# Patient Record
Sex: FEMALE | Race: WHITE | NOT HISPANIC OR LATINO | Employment: OTHER | URBAN - METROPOLITAN AREA
[De-identification: names, ages, dates, MRNs, and addresses within clinical notes are randomized per-mention and may not be internally consistent; named-entity substitution may affect disease eponyms.]

---

## 2020-07-30 ENCOUNTER — HOSPITAL ENCOUNTER (OUTPATIENT)
Facility: HOSPITAL | Age: 70
Discharge: HOME | End: 2020-07-30
Attending: EMERGENCY MEDICINE | Admitting: INTERNAL MEDICINE
Payer: MEDICARE

## 2020-07-30 VITALS
HEIGHT: 68 IN | TEMPERATURE: 98 F | HEART RATE: 77 BPM | WEIGHT: 156.94 LBS | RESPIRATION RATE: 19 BRPM | SYSTOLIC BLOOD PRESSURE: 117 MMHG | BODY MASS INDEX: 23.79 KG/M2 | DIASTOLIC BLOOD PRESSURE: 65 MMHG | OXYGEN SATURATION: 96 %

## 2020-07-30 DIAGNOSIS — I15.9 SECONDARY HYPERTENSION: ICD-10-CM

## 2020-07-30 DIAGNOSIS — I25.10 CORONARY ARTERY DISEASE WITHOUT ANGINA PECTORIS, UNSPECIFIED VESSEL OR LESION TYPE, UNSPECIFIED WHETHER NATIVE OR TRANSPLANTED HEART: ICD-10-CM

## 2020-07-30 DIAGNOSIS — R07.9 CHEST PAIN: Primary | ICD-10-CM

## 2020-07-30 PROBLEM — I10 HYPERTENSION: Status: ACTIVE | Noted: 2020-07-30

## 2020-07-30 LAB
ALBUMIN SERPL BCP-MCNC: 3.6 G/DL (ref 3.5–5.2)
ALP SERPL-CCNC: 78 U/L (ref 55–135)
ALT SERPL W/O P-5'-P-CCNC: 19 U/L (ref 10–44)
ANION GAP SERPL CALC-SCNC: 8 MMOL/L (ref 8–16)
AST SERPL-CCNC: 23 U/L (ref 10–40)
BASOPHILS # BLD AUTO: 0.04 K/UL (ref 0–0.2)
BASOPHILS NFR BLD: 0.4 % (ref 0–1.9)
BILIRUB SERPL-MCNC: 0.6 MG/DL (ref 0.1–1)
BNP SERPL-MCNC: 92 PG/ML (ref 0–99)
BUN SERPL-MCNC: 12 MG/DL (ref 8–23)
CALCIUM SERPL-MCNC: 9 MG/DL (ref 8.7–10.5)
CHLORIDE SERPL-SCNC: 102 MMOL/L (ref 95–110)
CHOLEST SERPL-MCNC: 114 MG/DL (ref 120–199)
CHOLEST/HDLC SERPL: 2.5 {RATIO} (ref 2–5)
CO2 SERPL-SCNC: 27 MMOL/L (ref 23–29)
CREAT SERPL-MCNC: 0.8 MG/DL (ref 0.5–1.4)
D DIMER PPP IA.FEU-MCNC: 0.51 UG/ML FEU
DIFFERENTIAL METHOD: ABNORMAL
EOSINOPHIL # BLD AUTO: 0.1 K/UL (ref 0–0.5)
EOSINOPHIL NFR BLD: 1.2 % (ref 0–8)
ERYTHROCYTE [DISTWIDTH] IN BLOOD BY AUTOMATED COUNT: 12.5 % (ref 11.5–14.5)
EST. GFR  (AFRICAN AMERICAN): >60 ML/MIN/1.73 M^2
EST. GFR  (NON AFRICAN AMERICAN): >60 ML/MIN/1.73 M^2
GLUCOSE SERPL-MCNC: 113 MG/DL (ref 70–110)
HCT VFR BLD AUTO: 36.4 % (ref 37–48.5)
HDLC SERPL-MCNC: 46 MG/DL (ref 40–75)
HDLC SERPL: 40.4 % (ref 20–50)
HGB BLD-MCNC: 11.8 G/DL (ref 12–16)
IMM GRANULOCYTES # BLD AUTO: 0.02 K/UL (ref 0–0.04)
IMM GRANULOCYTES NFR BLD AUTO: 0.2 % (ref 0–0.5)
LDLC SERPL CALC-MCNC: 49.4 MG/DL (ref 63–159)
LYMPHOCYTES # BLD AUTO: 1.4 K/UL (ref 1–4.8)
LYMPHOCYTES NFR BLD: 15.2 % (ref 18–48)
MCH RBC QN AUTO: 29.6 PG (ref 27–31)
MCHC RBC AUTO-ENTMCNC: 32.4 G/DL (ref 32–36)
MCV RBC AUTO: 92 FL (ref 82–98)
MONOCYTES # BLD AUTO: 0.6 K/UL (ref 0.3–1)
MONOCYTES NFR BLD: 5.9 % (ref 4–15)
NEUTROPHILS # BLD AUTO: 7.2 K/UL (ref 1.8–7.7)
NEUTROPHILS NFR BLD: 77.1 % (ref 38–73)
NONHDLC SERPL-MCNC: 68 MG/DL
NRBC BLD-RTO: 0 /100 WBC
PLATELET # BLD AUTO: 181 K/UL (ref 150–350)
PMV BLD AUTO: 10.4 FL (ref 9.2–12.9)
POTASSIUM SERPL-SCNC: 4.1 MMOL/L (ref 3.5–5.1)
PROT SERPL-MCNC: 7 G/DL (ref 6–8.4)
RBC # BLD AUTO: 3.98 M/UL (ref 4–5.4)
SARS-COV-2 RDRP RESP QL NAA+PROBE: NEGATIVE
SODIUM SERPL-SCNC: 137 MMOL/L (ref 136–145)
TRIGL SERPL-MCNC: 93 MG/DL (ref 30–150)
TROPONIN I SERPL DL<=0.01 NG/ML-MCNC: 0.03 NG/ML
TROPONIN I SERPL DL<=0.01 NG/ML-MCNC: <0.03 NG/ML
WBC # BLD AUTO: 9.35 K/UL (ref 3.9–12.7)

## 2020-07-30 PROCEDURE — 63600175 PHARM REV CODE 636 W HCPCS: Performed by: INTERNAL MEDICINE

## 2020-07-30 PROCEDURE — 84484 ASSAY OF TROPONIN QUANT: CPT

## 2020-07-30 PROCEDURE — 85025 COMPLETE CBC W/AUTO DIFF WBC: CPT

## 2020-07-30 PROCEDURE — 83036 HEMOGLOBIN GLYCOSYLATED A1C: CPT

## 2020-07-30 PROCEDURE — 36415 COLL VENOUS BLD VENIPUNCTURE: CPT

## 2020-07-30 PROCEDURE — 84484 ASSAY OF TROPONIN QUANT: CPT | Mod: 91

## 2020-07-30 PROCEDURE — 83880 ASSAY OF NATRIURETIC PEPTIDE: CPT

## 2020-07-30 PROCEDURE — 93005 ELECTROCARDIOGRAM TRACING: CPT | Performed by: INTERNAL MEDICINE

## 2020-07-30 PROCEDURE — 25000003 PHARM REV CODE 250: Performed by: INTERNAL MEDICINE

## 2020-07-30 PROCEDURE — U0002 COVID-19 LAB TEST NON-CDC: HCPCS

## 2020-07-30 PROCEDURE — 99285 EMERGENCY DEPT VISIT HI MDM: CPT | Mod: 25

## 2020-07-30 PROCEDURE — G0378 HOSPITAL OBSERVATION PER HR: HCPCS

## 2020-07-30 PROCEDURE — 80053 COMPREHEN METABOLIC PANEL: CPT

## 2020-07-30 PROCEDURE — 85379 FIBRIN DEGRADATION QUANT: CPT

## 2020-07-30 PROCEDURE — 80061 LIPID PANEL: CPT

## 2020-07-30 PROCEDURE — 25000003 PHARM REV CODE 250: Performed by: EMERGENCY MEDICINE

## 2020-07-30 PROCEDURE — G0378 HOSPITAL OBSERVATION PER HR: HCPCS | Mod: CS

## 2020-07-30 RX ORDER — PREDNISONE 20 MG/1
20 TABLET ORAL DAILY
Status: DISCONTINUED | OUTPATIENT
Start: 2020-07-30 | End: 2020-07-30 | Stop reason: HOSPADM

## 2020-07-30 RX ORDER — FAMOTIDINE 40 MG/1
40 TABLET, FILM COATED ORAL DAILY
COMMUNITY
End: 2020-11-17 | Stop reason: SDUPTHER

## 2020-07-30 RX ORDER — NITROGLYCERIN 80 MG/1
1 PATCH TRANSDERMAL DAILY
Status: ON HOLD | COMMUNITY
End: 2020-07-30 | Stop reason: HOSPADM

## 2020-07-30 RX ORDER — ASPIRIN 325 MG
325 TABLET ORAL
Status: COMPLETED | OUTPATIENT
Start: 2020-07-30 | End: 2020-07-30

## 2020-07-30 RX ORDER — CLOPIDOGREL BISULFATE 75 MG/1
75 TABLET ORAL DAILY
Status: DISCONTINUED | OUTPATIENT
Start: 2020-07-31 | End: 2020-07-30 | Stop reason: HOSPADM

## 2020-07-30 RX ORDER — SODIUM CHLORIDE 0.9 % (FLUSH) 0.9 %
10 SYRINGE (ML) INJECTION
Status: DISCONTINUED | OUTPATIENT
Start: 2020-07-30 | End: 2020-07-30 | Stop reason: HOSPADM

## 2020-07-30 RX ORDER — CARVEDILOL 3.12 MG/1
3.12 TABLET ORAL 2 TIMES DAILY WITH MEALS
COMMUNITY
End: 2020-11-02 | Stop reason: SDUPTHER

## 2020-07-30 RX ORDER — ACETAMINOPHEN 160 MG/5ML
200 SUSPENSION, ORAL (FINAL DOSE FORM) ORAL DAILY
Status: DISCONTINUED | OUTPATIENT
Start: 2020-07-30 | End: 2020-07-30

## 2020-07-30 RX ORDER — GLUCAGON 1 MG
1 KIT INJECTION
Status: DISCONTINUED | OUTPATIENT
Start: 2020-07-30 | End: 2020-07-30 | Stop reason: HOSPADM

## 2020-07-30 RX ORDER — LOSARTAN POTASSIUM 25 MG/1
25 TABLET ORAL DAILY
COMMUNITY
End: 2021-08-04

## 2020-07-30 RX ORDER — PANTOPRAZOLE SODIUM 40 MG/1
40 TABLET, DELAYED RELEASE ORAL
Status: DISCONTINUED | OUTPATIENT
Start: 2020-07-30 | End: 2020-07-30 | Stop reason: HOSPADM

## 2020-07-30 RX ORDER — NAPROXEN SODIUM 220 MG/1
81 TABLET, FILM COATED ORAL DAILY
Status: DISCONTINUED | OUTPATIENT
Start: 2020-07-31 | End: 2020-07-30 | Stop reason: HOSPADM

## 2020-07-30 RX ORDER — IBUPROFEN 200 MG
16 TABLET ORAL
Status: DISCONTINUED | OUTPATIENT
Start: 2020-07-30 | End: 2020-07-30 | Stop reason: HOSPADM

## 2020-07-30 RX ORDER — NITROGLYCERIN 80 MG/1
1 PATCH TRANSDERMAL DAILY
Status: DISCONTINUED | OUTPATIENT
Start: 2020-07-30 | End: 2020-07-30

## 2020-07-30 RX ORDER — CLOPIDOGREL BISULFATE 75 MG/1
75 TABLET ORAL DAILY
COMMUNITY
End: 2020-11-02 | Stop reason: SDUPTHER

## 2020-07-30 RX ORDER — FAMOTIDINE 20 MG/1
40 TABLET, FILM COATED ORAL DAILY
Status: DISCONTINUED | OUTPATIENT
Start: 2020-07-30 | End: 2020-07-30

## 2020-07-30 RX ORDER — CARVEDILOL 3.12 MG/1
3.12 TABLET ORAL 2 TIMES DAILY WITH MEALS
Status: DISCONTINUED | OUTPATIENT
Start: 2020-07-30 | End: 2020-07-30 | Stop reason: HOSPADM

## 2020-07-30 RX ORDER — ISOSORBIDE MONONITRATE 30 MG/1
30 TABLET, EXTENDED RELEASE ORAL DAILY
COMMUNITY
End: 2021-03-01

## 2020-07-30 RX ORDER — LANOLIN ALCOHOL/MO/W.PET/CERES
400 CREAM (GRAM) TOPICAL DAILY
Status: DISCONTINUED | OUTPATIENT
Start: 2020-07-31 | End: 2020-07-30 | Stop reason: HOSPADM

## 2020-07-30 RX ORDER — LOSARTAN POTASSIUM 25 MG/1
25 TABLET ORAL DAILY
Status: DISCONTINUED | OUTPATIENT
Start: 2020-07-31 | End: 2020-07-30 | Stop reason: HOSPADM

## 2020-07-30 RX ORDER — IBUPROFEN 200 MG
24 TABLET ORAL
Status: DISCONTINUED | OUTPATIENT
Start: 2020-07-30 | End: 2020-07-30 | Stop reason: HOSPADM

## 2020-07-30 RX ORDER — NAPROXEN SODIUM 220 MG/1
81 TABLET, FILM COATED ORAL DAILY
COMMUNITY

## 2020-07-30 RX ORDER — ACETAMINOPHEN 160 MG/5ML
200 SUSPENSION, ORAL (FINAL DOSE FORM) ORAL DAILY
COMMUNITY

## 2020-07-30 RX ORDER — ATORVASTATIN CALCIUM 40 MG/1
40 TABLET, FILM COATED ORAL NIGHTLY
Status: DISCONTINUED | OUTPATIENT
Start: 2020-07-30 | End: 2020-07-30 | Stop reason: HOSPADM

## 2020-07-30 RX ORDER — LANOLIN ALCOHOL/MO/W.PET/CERES
400 CREAM (GRAM) TOPICAL DAILY
COMMUNITY
End: 2021-04-21

## 2020-07-30 RX ORDER — ATORVASTATIN CALCIUM 40 MG/1
40 TABLET, FILM COATED ORAL DAILY
COMMUNITY
End: 2020-11-02 | Stop reason: SDUPTHER

## 2020-07-30 RX ORDER — ISOSORBIDE MONONITRATE 30 MG/1
30 TABLET, EXTENDED RELEASE ORAL DAILY
Status: DISCONTINUED | OUTPATIENT
Start: 2020-07-30 | End: 2020-07-30 | Stop reason: HOSPADM

## 2020-07-30 RX ADMIN — PREDNISONE 20 MG: 20 TABLET ORAL at 04:07

## 2020-07-30 RX ADMIN — ISOSORBIDE MONONITRATE 30 MG: 30 TABLET, EXTENDED RELEASE ORAL at 05:07

## 2020-07-30 RX ADMIN — CARVEDILOL 3.12 MG: 3.12 TABLET, FILM COATED ORAL at 04:07

## 2020-07-30 RX ADMIN — ASPIRIN 325 MG ORAL TABLET 325 MG: 325 PILL ORAL at 09:07

## 2020-07-30 NOTE — CONSULTS
[facility]  Department of Cardiology  Consult Note      PATIENT NAME: Holly French  MRN: [id number]  TODAY'S DATE: 07/30/2020  ADMIT DATE: 7/30/2020                          CONSULT REQUESTED BY: Chuck Mattson MD    SUBJECTIVE     PRINCIPAL PROBLEM: Chest pain      REASON FOR CONSULT:  Chest Pain      HPI:  Ms. French is a 70 year old female known to me. She has a history of CAD S/P PCI to LAD 2.5 X 18mm in 2017. She tells me she has been in her normal state of health untl this morning. Well last night after she ate she had a chest pain but it went away and 2 weeks ago she had a pain but went away. This morning it did not go away. She had no associated symptoms. She denies recent fever or chills. No arm neck or jaw pain. Has some dyspepsia which is chronic. First set of cardiac enzymes are negative awaiting second set. She describes it as exertional.     FROM H AND P     70-year-old female  With past medical history of hypertension, CAD status post stent in 2017 came to ER with CP.  She had brief history of right-sided chest pain about a few days back and self resolved . But this morning after she wake approximately 5:30 a.m. she felt left-sided nonradiating chest pain and persistent and came to ER .  No associated shortness of breath, vomiting, diaphoresis, fevers, chills.  She also concerned about she might probably have pleurisy because she was treated in the past 2 times by the primary care physician and she admits that she have mild intermittent cough occasionally .  She still currently on both aspirin and Plavix.  She is due to see with Dr. LATRELL Medley  today.  No recent stress test or echocardiogram was done .  She is not keen to do the stress test in house  and she rather wants to do as outpatient.  Denies fever, production of sputum, pleuritic chest pain, and no history of prolonged immobilization or travel.       Review of patient's allergies indicates:  No Known Allergies    Past Medical  History:   Diagnosis Date    Coronary artery disease     cardiac stent    Hypertension      History reviewed. No pertinent surgical history.  Social History     Tobacco Use    Smoking status: Never Smoker   Substance Use Topics    Alcohol use: Not Currently    Drug use: Not Currently        REVIEW OF SYSTEMS  CONSTITUTIONAL: Negative for chills, fatigue and fever.   EYES: No double vision, No blurred vision  NEURO: No headaches, No dizziness  RESPIRATORY: Negative for cough, shortness of breath and wheezing.    CARDIOVASCULAR: + chest pain  GI: Negative for abdominal pain, No melena, diarrhea, nausea and vomiting.   : Negative for dysuria and frequency, Negative for hematuria  SKIN: Negative for bruising, Negative for edema or discoloration noted.   ENDOCRINE: Negative for polyphagia, Negative for heat intolerance, Negative for cold intolerance  PSYCHIATRIC: Negative for depression, Negative for anxiety, Negative for memory loss  MUSCULOSKELETAL: Negative for neck pain, Negative for muscle weakness, Negative for back pain     OBJECTIVE     VITAL SIGNS (Most Recent)  Temp: 98.1 °F (36.7 °C) (07/30/20 1414)  Pulse: 70 (07/30/20 1414)  Resp: 18 (07/30/20 1414)  BP: (!) 148/65 (07/30/20 1414)  SpO2: 97 % (07/30/20 1414)    VENTILATION STATUS  Resp: 18 (07/30/20 1414)  SpO2: 97 % (07/30/20 1414)       I & O (Last 24H):No intake or output data in the 24 hours ending 07/30/20 1447    WEIGHTS  Wt Readings from Last 1 Encounters:   07/30/20 1414 71.2 kg (156 lb 15.5 oz)   07/30/20 0916 70.3 kg (155 lb)       PHYSICAL EXAM  GENERAL: well built, well nourished, well-developed in no apparent distress alert and oriented.   HEENT: Normocephalic. Pupils normal and conjunctivae normal.  Mucous membranes normal, no cyanosis or icterus, trachea central,no pallor or icterus is noted..   NECK: No JVD. No bruit..   THYROID: Thyroid not enlarged. No nodules present..   CARDIAC: Regular rate and rhythm. S1 is normal.S2 is normal.No  gallops, clicks or murmurs noted at this time.  CHEST ANATOMY: normal.   LUNGS: Clear to auscultation. No wheezing or rhonchi..   ABDOMEN: Soft no masses or organomegaly.  No abdomen pulsations or bruits.  Normal bowel sounds. No pulsations and no masses felt, No guarding or rebound.   URINARY: No hurtado catheter   EXTREMITIES: No cyanosis, clubbing or edema noted at this time., no calf tenderness bilaterally.   PERIPHERAL VASCULAR SYSTEM: Good palpable distal pulses.   CENTRAL NERVOUS SYSTEM: No focal motor or sensory deficits noted.   SKIN: Skin without lesions, moist, well perfused.   MUSCLE STRENGTH & TONE: No noteable weakness, atrophy or abnormal movement.     HOME MEDICATIONS:  No current facility-administered medications on file prior to encounter.      Current Outpatient Medications on File Prior to Encounter   Medication Sig Dispense Refill    aspirin 81 MG Chew Take 81 mg by mouth once daily.      atorvastatin (LIPITOR) 40 MG tablet Take 40 mg by mouth once daily.      carvediloL (COREG) 3.125 MG tablet Take 3.125 mg by mouth 2 (two) times daily with meals.      clopidogreL (PLAVIX) 75 mg tablet Take 75 mg by mouth once daily.      coenzyme Q10 200 mg capsule Take 200 mg by mouth once daily.      famotidine (PEPCID) 40 MG tablet Take 40 mg by mouth once daily.      isosorbide mononitrate (IMDUR) 30 MG 24 hr tablet Take 30 mg by mouth once daily.      losartan (COZAAR) 25 MG tablet Take 25 mg by mouth once daily.      magnesium oxide (MAG-OX) 400 mg (241.3 mg magnesium) tablet Take 400 mg by mouth once daily.      nitroGLYCERIN 0.4 MG/HR TD PT24 (NITRODUR) 0.4 mg/hr Place 1 patch onto the skin once daily.         SCHEDULED MEDS:   [START ON 7/31/2020] aspirin  81 mg Oral Daily    atorvastatin  40 mg Oral QHS    carvediloL  3.125 mg Oral BID WM    [START ON 7/31/2020] clopidogreL  75 mg Oral Daily    [START ON 7/31/2020] isosorbide mononitrate  30 mg Oral Daily    [START ON 7/31/2020]  losartan  25 mg Oral Daily    [START ON 7/31/2020] magnesium oxide  400 mg Oral Daily    pantoprazole  40 mg Oral Before breakfast    predniSONE  20 mg Oral Daily       CONTINUOUS INFUSIONS:    PRN MEDS:dextrose 50%, dextrose 50%, glucagon (human recombinant), glucose, glucose, sodium chloride 0.9%    LABS AND DIAGNOSTICS     CBC LAST 3 DAYS  Recent Labs   Lab 07/30/20  0945   WBC 9.35   RBC 3.98*   HGB 11.8*   HCT 36.4*   MCV 92   MCH 29.6   MCHC 32.4   RDW 12.5      MPV 10.4   GRAN 77.1*  7.2   LYMPH 15.2*  1.4   MONO 5.9  0.6   BASO 0.04   NRBC 0       COAGULATION LAST 3 DAYS  No results for input(s): LABPT, INR, APTT in the last 168 hours.    CHEMISTRY LAST 3 DAYS  Recent Labs   Lab 07/30/20  0945      K 4.1      CO2 27   ANIONGAP 8   BUN 12   CREATININE 0.8   *   CALCIUM 9.0   ALBUMIN 3.6   PROT 7.0   ALKPHOS 78   ALT 19   AST 23   BILITOT 0.6       CARDIAC PROFILE LAST 3 DAYS  Recent Labs   Lab 07/30/20  0945   BNP 92   TROPONINI <0.030       ENDOCRINE LAST 3 DAYS  No results for input(s): TSH, PROCAL in the last 168 hours.    LAST ARTERIAL BLOOD GAS  ABG  No results for input(s): PH, PO2, PCO2, HCO3, BE in the last 168 hours.    LAST 7 DAYS MICROBIOLOGY   Microbiology Results (last 7 days)     ** No results found for the last 168 hours. **          MOST RECENT IMAGING  X-Ray Chest AP Portable  HISTORY: Acute chest pain.    FINDINGS: Portable chest radiograph at 1014 hours compared to multiple  prior exams shows the cardiac silhouette and pulmonary vasculature are  within normal limits. Fusiform ectasia and tortuosity of the ascending  aorta is unchanged. Large retrocardiac lucency is compatible with  hiatal hernia, not significantly changed.    The lungs are normally expanded, with no consolidation, large pleural  effusion, or evidence of pulmonary edema. No confluent infiltrates or  pneumothorax. There are no significant osseous abnormalities.    IMPRESSION:  1. No evidence of  acute cardiopulmonary disease.  2. 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  3. Large hiatal hernia.    Electronically Signed by Gulshan DAMIAN on 7/30/2020 10:20 AM      ECHOCARDIOGRAM RESULTS (last 5)  No results found for this or any previous visit.    CURRENT/PREVIOUS VISIT EKG  Results for orders placed or performed during the hospital encounter of 07/30/20   EKG 12-lead    Collection Time: 07/30/20  9:24 AM    Narrative    Test Reason : R07.9,    Vent. Rate : 075 BPM     Atrial Rate : 075 BPM     P-R Int : 194 ms          QRS Dur : 094 ms      QT Int : 310 ms       P-R-T Axes : 065 -56 068 degrees     QTc Int : 346 ms    Normal sinus rhythm  Left anterior fascicular block  Anterolateral infarct ,age undetermined  Abnormal ECG  No previous ECGs available    Referred By: [email or web address]   SELF           Confirmed By:            ASSESSMENT/PLAN:     Active Hospital Problems    Diagnosis    *Chest pain    Coronary artery disease    Hypertension       ASSESSMENT & PLAN:   1. Chest Pain Troponin negative x 1  2. CAD H/O PCI to LAD in 2017  3. HTN  4. Dyslipidemia  5. Large Hiatal Hernia        RECOMMENDATIONS:    She tells me it is exertional chest pain  She does not want to stay and have a stress test she would rather it be done in the office.   Await second set of cardiac enzymes if negative   She may go home on medical therapy with her ASA, Plavix, Coreg, and Imdur  We will need to set up a stress myoview in the office for early next week  If positive will need to have coronary angiogram in the morning.  Await results  Thank you for the consultation      Deidre Starr NP  [facility]  Department of Cardiology  Date of Service: 07/30/2020  2:47 PM        I have reviewed the Nurse Practitioner's history and physical, assessment, and plan.

## 2020-07-30 NOTE — HOSPITAL COURSE
Patient was admitted with chest pain.  Cardiac enzymes are negative and D-dimer is not positive as per age appropriate level recommendation.    Cardiology reviewed.  Later patient was discharged in stable condition to do stress test as outpatient.

## 2020-07-30 NOTE — DISCHARGE SUMMARY
[facility]  Discharge Summary      Patient Name: Holly French  MRN: [id number]  Admission Date: 7/30/2020  Hospital Length of Stay: 0 days  Discharge Date and Time:  07/30/2020 6:12 PM  Attending Physician: Cyndy Mattson MD   Discharging Provider: Cyndy Mattson MD  Primary Care Provider: Jaime Vega MD      HPI:   70-year-old female  With past medical history of hypertension, CAD status post stent in 2017 came to ER with CP.  She had brief history of right-sided chest pain about a few days back and self resolved . But this morning after she wake approximately 5:30 a.m. she felt left-sided nonradiating chest pain and persistent and came to ER .  No associated shortness of breath, vomiting, diaphoresis, fevers, chills.  She also concerned about she might probably have pleurisy because she was treated in the past 2 times by the primary care physician and she admits that she have mild intermittent cough occasionally .  She still currently on both aspirin and Plavix.  She is due to see with Dr. LATRELL Medley  today.  No recent stress test or echocardiogram was done .  She is not keen to do the stress test in house  and she rather wants to do as outpatient.  Denies fever, production of sputum, pleuritic chest pain, and no history of prolonged immobilization or travel.    * No surgery found *      Hospital Course:   Patient was admitted with chest pain.  Cardiac enzymes are negative and D-dimer is not positive as per age appropriate level recommendation.    Cardiology reviewed.  Later patient was discharged in stable condition to do stress test as outpatient.     Consults:   Consults (From admission, onward)        Status Ordering Provider     Inpatient consult to Cardiology  Once     Provider:  Jose Daniel Medley MD    Completed CYNDY MATTSON     Inpatient consult to Hospitalist  Once     Provider:  Cyndy Mattson MD    Acknowledged ANDREW CANNON          No new Assessment & Plan notes  have been filed under this hospital service since the last note was generated.  Service: Hospital Medicine    Final Active Diagnoses:    Diagnosis Date Noted POA    PRINCIPAL PROBLEM:  Chest pain [R07.9] 07/30/2020 Unknown    Coronary artery disease [I25.10] 07/30/2020 Unknown    Hypertension [I10] 07/30/2020 Unknown      Problems Resolved During this Admission:       Discharged Condition: good    Disposition: Home or Self Care    Follow Up:  Follow-up Information     Jose Daniel Medley MD In 2 weeks.    Specialties: Cardiology, INTERVENTIONAL CARDIOLOGY  Contact information:  [facility] [facility]  [address]  [facility]  [facility] [address]  [phone]                 Patient Instructions:      Diet Cardiac     Notify your health care provider if you experience any of the following:  severe uncontrolled pain     Activity as tolerated       Significant Diagnostic Studies: Labs:   CMP   Recent Labs   Lab 07/30/20  0945      K 4.1      CO2 27   *   BUN 12   CREATININE 0.8   CALCIUM 9.0   PROT 7.0   ALBUMIN 3.6   BILITOT 0.6   ALKPHOS 78   AST 23   ALT 19   ANIONGAP 8   ESTGFRAFRICA >60.0   EGFRNONAA >60.0    and CBC   Recent Labs   Lab 07/30/20  0945   WBC 9.35   HGB 11.8*   HCT 36.4*          Pending Diagnostic Studies:     Procedure Component Value Units Date/Time    Hemoglobin A1c [[id number]] Collected: 07/30/20 1633    Order Status: Sent Lab Status: In process Updated: 07/30/20 1709    Specimen: Blood     Troponin I [[id number]]     Order Status: Sent Lab Status: No result     Specimen: Blood          Medications:  Reconciled Home Medications:      Medication List      CONTINUE taking these medications    aspirin 81 MG Chew  Take 81 mg by mouth once daily.     atorvastatin 40 MG tablet  Commonly known as: LIPITOR  Take 40 mg by mouth once daily.     carvediloL 3.125 MG tablet  Commonly known as: COREG  Take 3.125 mg by mouth 2 (two) times daily with meals.     clopidogreL 75 mg  tablet  Commonly known as: PLAVIX  Take 75 mg by mouth once daily.     coenzyme Q10 200 mg capsule  Take 200 mg by mouth once daily.     famotidine 40 MG tablet  Commonly known as: PEPCID  Take 40 mg by mouth once daily.     isosorbide mononitrate 30 MG 24 hr tablet  Commonly known as: IMDUR  Take 30 mg by mouth once daily.     losartan 25 MG tablet  Commonly known as: COZAAR  Take 25 mg by mouth once daily.     magnesium oxide 400 mg (241.3 mg magnesium) tablet  Commonly known as: MAG-OX  Take 400 mg by mouth once daily.        STOP taking these medications    nitroGLYCERIN 0.4 MG/HR TD PT24 0.4 mg/hr  Commonly known as: NITRODUR            Indwelling Lines/Drains at time of discharge:   Lines/Drains/Airways     None               Physical Exam:  General- Patient alert and oriented x3 in NAD  HEENT- PERRLA, EOMI, OP clear, MMM  Neck- No JVD, Lymphadenopathy, Thyromegaly  CV- Regular rate and rhythm, No Murmur/tiffany/rubs  Resp- Lungs CTA Bilaterally, No increased WOB  GI- Non tender/non-distended, BS normoactive x4 quads, no HSM  Extrem- No cyanosis, clubbing, edema. Pulses 2+ and symmetric  Neuro- Strength 5/5 flexors/extensors, DTRs 2+ and symmetric, Intact sensation to light touch grossly  Skin-  No masses, rashes or lesions noted on cursory skin exam.  Time spent on the discharge of patient: 23 minutes  Patient was seen and examined on the date of discharge and determined to be suitable for discharge.         Chuck Mattson MD  Department of Hospital Medicine  [facility]

## 2020-07-30 NOTE — ASSESSMENT & PLAN NOTE
Left-sided chest pain in previous history of coronary artery disease s/p stent  Patient    Plan   Trend cardiac enzymes  Will not order  stress test or echocardiogram, consult Dr. LATRELL Medley   Patient is not keen to do stressors at this moment  Patient got aspirin 325 mg in ER  A1c and lipid panel

## 2020-07-30 NOTE — H&P
[facility]  History & Physical    Patient Name: Holly French  MRN: [id number]  Admission Date: 7/30/2020  Attending Physician: Abhilash Almonte MD   Primary Care Provider: Jaime Vega MD         Patient information was obtained from patient and ER records.     Subjective:     Principal Problem:Chest pain    Chief Complaint:   Chief Complaint   Patient presents with    Chest Pain        HPI: 70-year-old female  With past medical history of hypertension, CAD status post stent in 2017 came to ER with CP.  She had brief history of right-sided chest pain about a few days back and self resolved . But this morning after she wake approximately 5:30 a.m. she felt left-sided nonradiating chest pain and persistent and came to ER .  No associated shortness of breath, vomiting, diaphoresis, fevers, chills.  She also concerned about she might probably have pleurisy because she was treated in the past 2 times by the primary care physician and she admits that she have mild intermittent cough occasionally .  She still currently on both aspirin and Plavix.  She is due to see with Dr. LATRELL Medley  today.  No recent stress test or echocardiogram was done .  She is not keen to do the stress test in house  and she rather wants to do as outpatient.  Denies fever, production of sputum, pleuritic chest pain, and no history of prolonged immobilization or travel.    Past Medical History:   Diagnosis Date    Coronary artery disease     cardiac stent    Hypertension        History reviewed. No pertinent surgical history.    Review of patient's allergies indicates:  No Known Allergies    No current facility-administered medications on file prior to encounter.      Current Outpatient Medications on File Prior to Encounter   Medication Sig    aspirin 81 MG Chew Take 81 mg by mouth once daily.    atorvastatin (LIPITOR) 40 MG tablet Take 40 mg by mouth once daily.    carvediloL (COREG) 3.125 MG tablet Take  3.125 mg by mouth 2 (two) times daily with meals.    clopidogreL (PLAVIX) 75 mg tablet Take 75 mg by mouth once daily.    coenzyme Q10 200 mg capsule Take 200 mg by mouth once daily.    famotidine (PEPCID) 40 MG tablet Take 40 mg by mouth once daily.    isosorbide mononitrate (IMDUR) 30 MG 24 hr tablet Take 30 mg by mouth once daily.    losartan (COZAAR) 25 MG tablet Take 25 mg by mouth once daily.    magnesium oxide (MAG-OX) 400 mg (241.3 mg magnesium) tablet Take 400 mg by mouth once daily.    nitroGLYCERIN 0.4 MG/HR TD PT24 (NITRODUR) 0.4 mg/hr Place 1 patch onto the skin once daily.     Family History     Problem Relation (Age of Onset)    No Known Problems Mother, Father        Tobacco Use    Smoking status: Never Smoker   Substance and Sexual Activity    Alcohol use: Not Currently    Drug use: Not Currently    Sexual activity: Not Currently     Review of Systems   Constitutional: Negative for appetite change.   HENT: Negative for congestion.    Respiratory: Negative for chest tightness, shortness of breath, wheezing and stridor.    Cardiovascular: Positive for chest pain. Negative for palpitations and leg swelling.   Gastrointestinal: Negative for abdominal distention and abdominal pain.   Genitourinary: Negative for difficulty urinating.   Musculoskeletal: Negative for arthralgias.   Neurological: Negative for dizziness.   Psychiatric/Behavioral: Negative for agitation.     Objective:     Vital Signs (Most Recent):  Temp: 98.1 °F (36.7 °C) (07/30/20 0916)  Pulse: 66 (07/30/20 1145)  Resp: 19 (07/30/20 1145)  BP: 136/65 (07/30/20 1130)  SpO2: 99 % (07/30/20 1145) Vital Signs (24h Range):  Temp:  [98.1 °F (36.7 °C)] 98.1 °F (36.7 °C)  Pulse:  [65-78] 66  Resp:  [16-22] 19  SpO2:  [97 %-100 %] 99 %  BP: (124-166)/(64-75) 136/65     Weight: 70.3 kg (155 lb)  Body mass index is 23.57 kg/m².    Physical Exam  Vitals signs and nursing note reviewed.   HENT:      Head: Normocephalic and atraumatic.   Eyes:       Conjunctiva/sclera: Conjunctivae normal.   Neck:      Vascular: No JVD.   Cardiovascular:      Heart sounds: Normal heart sounds.   Pulmonary:      Effort: Pulmonary effort is normal.      Breath sounds: Normal breath sounds.   Abdominal:      General: Bowel sounds are normal.      Palpations: Abdomen is soft.   Skin:     General: Skin is warm.   Neurological:      Mental Status: She is alert and oriented to person, place, and time.   Psychiatric:         Mood and Affect: Mood normal.             Significant Labs:   BMP:   Recent Labs   Lab 07/30/20  0945   *      K 4.1      CO2 27   BUN 12   CREATININE 0.8   CALCIUM 9.0     CBC:   Recent Labs   Lab 07/30/20  0945   WBC 9.35   HGB 11.8*   HCT 36.4*          Significant Imaging: I have reviewed all pertinent imaging results/findings within the past 24 hours.    Assessment/Plan:     * Chest pain  Left-sided chest pain in previous history of coronary artery disease s/p stent  Patient    Plan   Trend cardiac enzymes  Will not order  stress test or echocardiogram, consult Dr. LATRELL Medley since she is due to see him today at 2 pm .   Patient is not keen to do stress test at this moment  Patient got aspirin 325 mg in ER  A1c and lipid panel  Add D dimer   Add PO steroid   PPI         Hypertension  Controlled . Home  meds      Coronary artery disease  S/p stent     Plan   Continue as planned Plavix, statin, aspirin        VTE Risk Mitigation (From admission, onward)         Ordered     IP VTE HIGH RISK PATIENT  Once      07/30/20 1157     Place sequential compression device  Until discontinued      07/30/20 1157                   Chuck Mattson MD  Department of Hospital Medicine   [facility]

## 2020-07-30 NOTE — ED NOTES
Report attempted. Diana states room being cleaned at this time. Pt & family informed of wait for admitted room.

## 2020-07-30 NOTE — SUBJECTIVE & OBJECTIVE
Past Medical History:   Diagnosis Date    Coronary artery disease     cardiac stent    Hypertension        History reviewed. No pertinent surgical history.    Review of patient's allergies indicates:  No Known Allergies    No current facility-administered medications on file prior to encounter.      Current Outpatient Medications on File Prior to Encounter   Medication Sig    aspirin 81 MG Chew Take 81 mg by mouth once daily.    atorvastatin (LIPITOR) 40 MG tablet Take 40 mg by mouth once daily.    carvediloL (COREG) 3.125 MG tablet Take 3.125 mg by mouth 2 (two) times daily with meals.    clopidogreL (PLAVIX) 75 mg tablet Take 75 mg by mouth once daily.    coenzyme Q10 200 mg capsule Take 200 mg by mouth once daily.    famotidine (PEPCID) 40 MG tablet Take 40 mg by mouth once daily.    isosorbide mononitrate (IMDUR) 30 MG 24 hr tablet Take 30 mg by mouth once daily.    losartan (COZAAR) 25 MG tablet Take 25 mg by mouth once daily.    magnesium oxide (MAG-OX) 400 mg (241.3 mg magnesium) tablet Take 400 mg by mouth once daily.    nitroGLYCERIN 0.4 MG/HR TD PT24 (NITRODUR) 0.4 mg/hr Place 1 patch onto the skin once daily.     Family History     Problem Relation (Age of Onset)    No Known Problems Mother, Father        Tobacco Use    Smoking status: Never Smoker   Substance and Sexual Activity    Alcohol use: Not Currently    Drug use: Not Currently    Sexual activity: Not Currently     Review of Systems   Constitutional: Negative for appetite change.   HENT: Negative for congestion.    Respiratory: Negative for chest tightness, shortness of breath, wheezing and stridor.    Cardiovascular: Positive for chest pain. Negative for palpitations and leg swelling.   Gastrointestinal: Negative for abdominal distention and abdominal pain.   Genitourinary: Negative for difficulty urinating.   Musculoskeletal: Negative for arthralgias.   Neurological: Negative for dizziness.   Psychiatric/Behavioral: Negative for  agitation.     Objective:     Vital Signs (Most Recent):  Temp: 98.1 °F (36.7 °C) (07/30/20 0916)  Pulse: 66 (07/30/20 1145)  Resp: 19 (07/30/20 1145)  BP: 136/65 (07/30/20 1130)  SpO2: 99 % (07/30/20 1145) Vital Signs (24h Range):  Temp:  [98.1 °F (36.7 °C)] 98.1 °F (36.7 °C)  Pulse:  [65-78] 66  Resp:  [16-22] 19  SpO2:  [97 %-100 %] 99 %  BP: (124-166)/(64-75) 136/65     Weight: 70.3 kg (155 lb)  Body mass index is 23.57 kg/m².    Physical Exam  Vitals signs and nursing note reviewed.   HENT:      Head: Normocephalic and atraumatic.   Eyes:      Conjunctiva/sclera: Conjunctivae normal.   Neck:      Vascular: No JVD.   Cardiovascular:      Heart sounds: Normal heart sounds.   Pulmonary:      Effort: Pulmonary effort is normal.      Breath sounds: Normal breath sounds.   Abdominal:      General: Bowel sounds are normal.      Palpations: Abdomen is soft.   Skin:     General: Skin is warm.   Neurological:      Mental Status: She is alert and oriented to person, place, and time.   Psychiatric:         Mood and Affect: Mood normal.             Significant Labs:   BMP:   Recent Labs   Lab 07/30/20  0945   *      K 4.1      CO2 27   BUN 12   CREATININE 0.8   CALCIUM 9.0     CBC:   Recent Labs   Lab 07/30/20  0945   WBC 9.35   HGB 11.8*   HCT 36.4*          Significant Imaging: I have reviewed all pertinent imaging results/findings within the past 24 hours.

## 2020-07-30 NOTE — HPI
70-year-old female  With past medical history of hypertension, CAD status post stent in 2017 came to ER with CP.  She had brief history of right-sided chest pain about a few days back and self resolved . But this morning after she wake approximately 5:30 a.m. she felt left-sided nonradiating chest pain and persistent and came to ER .  No associated shortness of breath, vomiting, diaphoresis, fevers, chills.  She also concerned about she might probably have pleurisy because she was treated in the past 2 times by the primary care physician and she admits that she have mild intermittent cough occasionally .  She still currently on both aspirin and Plavix.  She is due to see with Dr. LATRELL Medley  today.  No recent stress test or echocardiogram was done .  She is not keen to do the stress test in house  and she rather wants to do as outpatient.  Denies fever, production of sputum, pleuritic chest pain, and no history of prolonged immobilization or travel.

## 2020-07-30 NOTE — ED NOTES
To er with c/o chest pain. Onset this am. Pt states took tylenol with relief. Maew. Speech clear. Skin w/dr/pk. rr even/unlab. Vss. Aaox4. Pt denies n/v/d. + mild non productive cough. No fever.

## 2020-07-30 NOTE — ED PROVIDER NOTES
Encounter Date: 7/30/2020       History     Chief Complaint   Patient presents with    Chest Pain     70-year-old female past medical history of hypertension, CAD status post stent in 2017 presents today with chest pain.  Patient reports chest pain began approximately 5:30 a.m. after she woke up.  She reports it is left-sided nonradiating chest pain no chest pressure.  She states nothing makes it worse but she says much improved after taking Tylenol.  She says no associated shortness of breath, vomiting, diaphoresis, fevers, chills.  She states that this feels different than her heart attack 3 years ago.  She says at the time of her heart attack she had no pain but a lot of chest pressure.  Today it is only pain and no pressure.  She did say she had some mild nausea shortly after taking Tylenol on an empty stomach but resolved with 2 pieces of toast.  Patient does report mild intermittent cough but otherwise denies any symptoms.  Denies abdominal pain, diarrhea, constipation, dysuria, hematuria or any other complaints.  Patient reports she has never smoked cigarettes but does have a family history of heart disease.    The history is provided by the patient. No  was used.     Review of patient's allergies indicates:  No Known Allergies  Past Medical History:   Diagnosis Date    Coronary artery disease     cardiac stent    Hypertension      History reviewed. No pertinent surgical history.  History reviewed. No pertinent family history.  Social History     Tobacco Use    Smoking status: Never Smoker   Substance Use Topics    Alcohol use: Not on file    Drug use: Not on file     Review of Systems   Constitutional: Negative for activity change, diaphoresis and fever.   HENT: Negative for ear pain, rhinorrhea, sore throat and trouble swallowing.    Eyes: Negative for pain and visual disturbance.   Respiratory: Positive for cough. Negative for shortness of breath and stridor.    Cardiovascular:  Positive for chest pain.   Gastrointestinal: Positive for nausea. Negative for abdominal pain, blood in stool, diarrhea and vomiting.   Genitourinary: Negative for dysuria, hematuria, vaginal bleeding and vaginal discharge.   Musculoskeletal: Negative for gait problem.   Skin: Negative for rash and wound.   Neurological: Negative for seizures and headaches.   Psychiatric/Behavioral: Negative for hallucinations and suicidal ideas.       Physical Exam     Initial Vitals [07/30/20 0916]   BP Pulse Resp Temp SpO2   (!) 166/75 78 16 98.1 °F (36.7 °C) 97 %      MAP       --         Physical Exam    Nursing note and vitals reviewed.  Constitutional: She appears well-developed. She is not diaphoretic. No distress.   HENT:   Head: Normocephalic and atraumatic.   Nose: Nose normal.   Eyes: EOM are normal. No scleral icterus.   Neck: Normal range of motion. Neck supple.   Cardiovascular: Normal rate, regular rhythm, normal heart sounds and intact distal pulses. Exam reveals no gallop and no friction rub.    No murmur heard.  Pulmonary/Chest: Breath sounds normal. No stridor. No respiratory distress. She has no wheezes. She has no rhonchi. She has no rales.   Abdominal: Soft. Bowel sounds are normal. There is no abdominal tenderness.   Musculoskeletal: Normal range of motion.   Neurological: She is alert and oriented to person, place, and time. No cranial nerve deficit.   Skin: Skin is warm and dry. Capillary refill takes less than 2 seconds. No rash noted.   Psychiatric: She has a normal mood and affect.         ED Course   Procedures  Labs Reviewed   CBC W/ AUTO DIFFERENTIAL   COMPREHENSIVE METABOLIC PANEL   TROPONIN I   B-TYPE NATRIURETIC PEPTIDE   SARS-COV-2 RNA AMPLIFICATION, QUAL        ECG Results          EKG 12-lead (In process)  Result time 07/30/20 09:28:02    In process by Interface, Lab In [facility] (07/30/20 09:28:02)                 Narrative:    Test Reason : R07.9,    Vent. Rate : 075 BPM     Atrial Rate : 075  BPM     P-R Int : 194 ms          QRS Dur : 094 ms      QT Int : 310 ms       P-R-T Axes : 065 -56 068 degrees     QTc Int : 346 ms    Normal sinus rhythm  Left anterior fascicular block  Anterolateral infarct ,age undetermined  Abnormal ECG  No previous ECGs available    Referred By: [email or web address]   SELF           Confirmed By:                             Imaging Results          X-Ray Chest AP Portable (In process)                  Medical Decision Making:   ED Management:  Will admit for chest pain rule out given HEART score.                    ED Course as of Jul 30 2304   Thu Jul 30, 2020   0943 70-year-old female past medical history of hypertension and CAD status post stent in 2017 presents today with chest pain x4 hours period afebrile.  Mildly hypertensive otherwise reassuring vital signs.  EKG no STEMI.  Given cardiac history will initiate ACS workup as well as workup for causes of chest pain.  Patient given 325 aspirin.    [BD]   0945 EKG normal sinus rhythm.  Rate of 75.  Normal intervals.  Left anterior fascicular block.  Anterior lateral infarct, age undetermined.  No STEMI.    [BD]   1034 IMPRESSION:  1. No evidence of acute cardiopulmonary disease.  2. 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  3. Large hiatal hernia.     Electronically Signed by Gulshan DAMIAN on 7/30/2020 10:20 AM    [BD]   1108 HEART Score For Major Cardiac Events  History (slightly-highly suspicious) = 1/2 pts  EKG: (NL, Nonspec, Sig ST changes) = 1/2pts  Age: (<45, 45-65, >65) = 2/2pts  RF (HTN, High Chol, DM, Cig, FH, Obesity): (0, 1-2, 3+) = 2/2pts  Troponin: (nl, 1-3x nl limit, >3x nl) = 0/2pts    TOTAL PTS: 6    Low (0-3pts) = risk of MACE 0.9-1.7% - supports immediate discharge  Moderate (4-7pts) = risk of MACE 12-16.6% - supports admission for clinical observation  High (8-10pts) = MACE 50-65% - supports early invasive strategies      [BD]      ED Course User Index  [BD] Abhilash Almonte MD                 Clinical Impression:       ICD-10-CM ICD-9-CM   1. Chest pain  R07.9 786.50   2. Coronary artery disease without angina pectoris, unspecified vessel or lesion type, unspecified whether native or transplanted heart  I25.10 414.00   3. Secondary hypertension  I15.9 405.99                                Abhilash Almonte MD  07/30/20 9221

## 2020-07-31 LAB
ESTIMATED AVG GLUCOSE: 117 MG/DL (ref 68–131)
HBA1C MFR BLD HPLC: 5.7 % (ref 4.5–6.2)

## 2020-08-07 ENCOUNTER — TELEPHONE (OUTPATIENT)
Dept: EMERGENCY MEDICINE | Facility: HOSPITAL | Age: 70
End: 2020-08-07

## 2020-08-07 NOTE — TELEPHONE ENCOUNTER
Your test was NEGATIVE for COVID-19 (coronavirus).      Returned patient call to notify of Covid results.        Sincerely,    Eliana Bruno RN

## 2020-09-21 ENCOUNTER — TELEPHONE (OUTPATIENT)
Dept: CARDIOLOGY | Facility: CLINIC | Age: 70
End: 2020-09-21

## 2020-09-21 DIAGNOSIS — M94.0 COSTOCHONDRITIS, ACUTE: Primary | ICD-10-CM

## 2020-09-21 NOTE — TELEPHONE ENCOUNTER
States is not as bad as it was but its there, fell on left side possible bruising bengay is helping left breast saw dr horton about it but he states acid reflux ..       ----- Message from Jalyn Wang sent at 9/21/2020  2:02 PM CDT -----  Regarding: Medical Advice  Pt stated that CANDE Starr provided her with some medication for chest pains she was having 08/17/2020. She stated that the meds worked as long as she was taking it now that she is no longer on the meds it is now back and localized. Pt is wondering what she need to do. Please advise    Callback: [phone]

## 2020-09-21 NOTE — TELEPHONE ENCOUNTER
Use Ibueprofen for inflammation  Check ESR CRP            ----- Message from Shira Hein MA sent at 9/21/2020  3:18 PM CDT -----  Regarding: FW: Medical Advice    ----- Message -----  From: Jalyn Wang  Sent: 9/21/2020   2:02 PM CDT  To: Matty Jiang Staff  Subject: Medical Advice                                   Pt stated that NP Matty provided her with some medication for chest pains she was having 08/17/2020. She stated that the meds worked as long as she was taking it now that she is no longer on the meds it is now back and localized. Pt is wondering what she need to do. Please advise    Callback: [phone]

## 2020-09-23 ENCOUNTER — LAB VISIT (OUTPATIENT)
Dept: LAB | Facility: HOSPITAL | Age: 70
End: 2020-09-23
Attending: NURSE PRACTITIONER
Payer: MEDICARE

## 2020-09-23 DIAGNOSIS — M94.0 COSTOCHONDRITIS, ACUTE: ICD-10-CM

## 2020-09-23 LAB
CRP SERPL-MCNC: 0.07 MG/DL (ref 0–0.75)
ERYTHROCYTE [SEDIMENTATION RATE] IN BLOOD BY WESTERGREN METHOD: 12 MM/HR (ref 0–20)

## 2020-09-23 PROCEDURE — 85651 RBC SED RATE NONAUTOMATED: CPT

## 2020-09-23 PROCEDURE — 86140 C-REACTIVE PROTEIN: CPT

## 2020-09-23 PROCEDURE — 36415 COLL VENOUS BLD VENIPUNCTURE: CPT

## 2020-09-25 ENCOUNTER — TELEPHONE (OUTPATIENT)
Dept: CARDIOLOGY | Facility: CLINIC | Age: 70
End: 2020-09-25

## 2020-09-25 NOTE — TELEPHONE ENCOUNTER
Patient notified of results possibly Hernia but under left breast and under arm       ----- Message from Deidre Starr NP sent at 9/25/2020 10:10 AM CDT -----  Inflammatory markers are negative  Use Lidoderm Patch OTC  If pain continues consider GI work up   Recent stress test is negative for RI  However if pain does not subside may need to consider further testing also

## 2020-11-02 RX ORDER — CLOPIDOGREL BISULFATE 75 MG/1
75 TABLET ORAL DAILY
Qty: 90 TABLET | Refills: 3 | Status: SHIPPED | OUTPATIENT
Start: 2020-11-02 | End: 2021-10-22

## 2020-11-02 RX ORDER — ATORVASTATIN CALCIUM 40 MG/1
40 TABLET, FILM COATED ORAL NIGHTLY
Qty: 90 TABLET | Refills: 3 | Status: SHIPPED | OUTPATIENT
Start: 2020-11-02 | End: 2021-10-26

## 2020-11-02 RX ORDER — CARVEDILOL 3.12 MG/1
3.12 TABLET ORAL 2 TIMES DAILY WITH MEALS
Qty: 180 TABLET | Refills: 3 | Status: SHIPPED | OUTPATIENT
Start: 2020-11-02 | End: 2021-10-22

## 2020-11-02 NOTE — TELEPHONE ENCOUNTER
----- Message from Jalyn Dinh, Patient Care Assistant sent at 11/2/2020 10:51 AM CST -----  Regarding: refill  Contact: patient  Type:  RX Refill Request    Who Called:  patient   Refill or New Rx: refill  RX Name and Strength:   clopidogreL (PLAVIX) 75 mg tablet carvediloL (COREG) 3.125 MG tablet atorvastatin (LIPITOR) 40 MG tablet  How is the patient currently taking it? (ex. 1XDay):    Is this a 30 day or 90 day RX:  90  Preferred Pharmacy with phone number:    [facility]/pharmacy #[id number] - [place], [place] - [address]  [address] [place] [address]  Phone: [phone] Fax: [phone]  Local or Mail Order:  local   Ordering Provider:  Dr. Galindo Fields Call Back Number:  [phone] (home)   Additional Information:  please call pt to advice. Thanks!

## 2020-11-16 ENCOUNTER — OFFICE VISIT (OUTPATIENT)
Dept: CARDIOLOGY | Facility: CLINIC | Age: 70
End: 2020-11-16
Payer: MEDICARE

## 2020-11-16 VITALS
WEIGHT: 158 LBS | RESPIRATION RATE: 18 BRPM | BODY MASS INDEX: 23.95 KG/M2 | TEMPERATURE: 98 F | SYSTOLIC BLOOD PRESSURE: 138 MMHG | DIASTOLIC BLOOD PRESSURE: 82 MMHG | OXYGEN SATURATION: 95 % | HEART RATE: 67 BPM | HEIGHT: 68 IN

## 2020-11-16 DIAGNOSIS — K21.9 GASTROESOPHAGEAL REFLUX DISEASE WITHOUT ESOPHAGITIS: ICD-10-CM

## 2020-11-16 DIAGNOSIS — I51.9 LV DYSFUNCTION: ICD-10-CM

## 2020-11-16 DIAGNOSIS — E78.5 DYSLIPIDEMIA: ICD-10-CM

## 2020-11-16 DIAGNOSIS — I25.10 CORONARY ARTERY DISEASE WITHOUT ANGINA PECTORIS, UNSPECIFIED VESSEL OR LESION TYPE, UNSPECIFIED WHETHER NATIVE OR TRANSPLANTED HEART: Primary | ICD-10-CM

## 2020-11-16 PROCEDURE — 99214 PR OFFICE/OUTPT VISIT, EST, LEVL IV, 30-39 MIN: ICD-10-PCS | Mod: S$GLB,,, | Performed by: INTERNAL MEDICINE

## 2020-11-16 PROCEDURE — 99214 OFFICE O/P EST MOD 30 MIN: CPT | Mod: S$GLB,,, | Performed by: INTERNAL MEDICINE

## 2020-11-16 RX ORDER — LANSOPRAZOLE 30 MG/1
30 CAPSULE, DELAYED RELEASE ORAL 2 TIMES DAILY
COMMUNITY
Start: 2020-09-01 | End: 2021-12-09

## 2020-11-16 RX ORDER — NITROGLYCERIN 80 MG/1
PATCH TRANSDERMAL
COMMUNITY
Start: 2020-09-08 | End: 2020-12-03 | Stop reason: SDUPTHER

## 2020-11-16 RX ORDER — CARVEDILOL 6.25 MG/1
6.25 TABLET ORAL 2 TIMES DAILY WITH MEALS
COMMUNITY
End: 2021-06-03 | Stop reason: DRUGHIGH

## 2020-11-16 NOTE — PROGRESS NOTES
Subjective:    Patient ID:  Holly French is a 70 y.o. female patient here for evaluation Coronary Artery Disease, Hypertension, and Chest Pain      History of Present Illness:         Ms. French is here today for her follow up visit. She denies chest pain or SOB. No lightheaded or dizziness. No recent arm neck or jaw pain. She is doing well other wise. She is no longer having chest pain or GERD like symptoms. She is doing well overall.     Review of patient's allergies indicates:   Allergen Reactions    Enalapril     Moxifloxacin     Sulfa (sulfonamide antibiotics)        Past Medical History:   Diagnosis Date    Coronary artery disease     cardiac stent    Hypertension      No past surgical history on file.  Social History     Tobacco Use    Smoking status: Never Smoker   Substance Use Topics    Alcohol use: Not Currently    Drug use: Not Currently        Review of Systems:    As noted in HPI in addition      REVIEW OF SYSTEMS  CARDIOVASCULAR: No recent chest pain, palpitations, arm, neck, or jaw pain  RESPIRATORY: No recent fever, cough chills, SOB or congestion  : No blood in the urine  GI: No Nausea, vomiting, constipation, diarrhea, blood, or reflux.  MUSCULOSKELETAL: No myalgias  NEURO: No lightheadedness or dizziness  EYES: No Double vision, blurry, vision or headache              Objective:        Vitals:    11/16/20 1335   BP: 138/82   Pulse: 67   Resp: 18   Temp: 97.7 °F (36.5 °C)       LIPIDS - LAST 2   Lab Results   Component Value Date    CHOL 114 (L) 07/30/2020    HDL 46 07/30/2020    LDLCALC 49.4 (L) 07/30/2020    TRIG 93 07/30/2020    CHOLHDL 40.4 07/30/2020       CBC - LAST 2  Lab Results   Component Value Date    WBC 9.35 07/30/2020    RBC 3.98 (L) 07/30/2020    HGB 11.8 (L) 07/30/2020    HCT 36.4 (L) 07/30/2020    MCV 92 07/30/2020    MCH 29.6 07/30/2020    MCHC 32.4 07/30/2020    RDW 12.5 07/30/2020     07/30/2020    MPV 10.4 07/30/2020    GRAN 7.2 07/30/2020    GRAN 77.1 (H)  07/30/2020    LYMPH 1.4 07/30/2020    LYMPH 15.2 (L) 07/30/2020    MONO 0.6 07/30/2020    MONO 5.9 07/30/2020    BASO 0.04 07/30/2020    NRBC 0 07/30/2020       CHEMISTRY & LIVER FUNCTION - LAST 2  Lab Results   Component Value Date     07/30/2020    K 4.1 07/30/2020     07/30/2020    CO2 27 07/30/2020    ANIONGAP 8 07/30/2020    BUN 12 07/30/2020    CREATININE 0.8 07/30/2020     (H) 07/30/2020    CALCIUM 9.0 07/30/2020    ALBUMIN 3.6 07/30/2020    PROT 7.0 07/30/2020    ALKPHOS 78 07/30/2020    ALT 19 07/30/2020    AST 23 07/30/2020    BILITOT 0.6 07/30/2020        CARDIAC PROFILE - LAST 2  Lab Results   Component Value Date    BNP 92 07/30/2020    TROPONINI 0.030 07/30/2020    TROPONINI <0.030 07/30/2020        COAGULATION - LAST 2  No results found for: LABPT, INR, APTT    ENDOCRINE & PSA - LAST 2  Lab Results   Component Value Date    HGBA1C 5.7 07/30/2020        ECHOCARDIOGRAM RESULTS  No results found for this or any previous visit.    CURRENT/PREVIOUS VISIT EKG  Results for orders placed or performed during the hospital encounter of 07/30/20   EKG 12-lead    Collection Time: 07/30/20  9:24 AM    Narrative    Test Reason : R07.9,    Vent. Rate : 075 BPM     Atrial Rate : 075 BPM     P-R Int : 194 ms          QRS Dur : 094 ms      QT Int : 310 ms       P-R-T Axes : 065 -56 068 degrees     QTc Int : 346 ms    Normal sinus rhythm  Left anterior fascicular block  Anterolateral infarct ,age undetermined  Abnormal ECG  No previous ECGs available  Confirmed by Jerry Cao MD ([id number]) on 7/31/2020 5:18:55 PM    Referred By: JANICE   SELF           Confirmed By:Jerry Cao MD       PHYSICAL EXAM  CONSTITUTIONAL: Well built, well nourished in no apparent distress  NECK: no carotid bruit, no JVD  LUNGS: CTA  CHEST WALL: no tenderness  HEART: regular rate and rhythm, S1, S2 normal, no murmur, click, rub or gallop   ABDOMEN: soft, non-tender; bowel sounds normal; no masses,  no  organomegaly  EXTREMITIES: Extremities normal, no edema, no calf tenderness noted  NEURO: AAO X 3    I HAVE REVIEWED :    The vital signs, nurses notes, and all the pertinent radiology and labs.         Current Outpatient Medications   Medication Instructions    aspirin 81 mg, Oral, Daily    atorvastatin (LIPITOR) 40 mg, Oral, Nightly    carvediloL (COREG) 3.125 mg, Oral, 2 times daily with meals    carvediloL (COREG) 6.25 mg, Oral, 2 times daily with meals    clopidogreL (PLAVIX) 75 mg, Oral, Daily    coenzyme Q10 200 mg, Oral, Daily    famotidine (PEPCID) 40 mg, Oral, Daily    isosorbide mononitrate (IMDUR) 30 mg, Oral, Daily    lansoprazole (PREVACID) 30 mg, Oral, 2 times daily    losartan (COZAAR) 25 mg, Oral, Daily    magnesium oxide (MAG-OX) 400 mg, Oral, Daily    nitroGLYCERIN 0.4 MG/HR TD PT24 (NITRODUR) 0.4 mg/hr PLACE 1 PATCH ONTO SKIN IN MORNING, REMOVE BEFORE BEDTIME ALLOW 8 HOURS BETWEEN PATCHES          Assessment:     1.  Coronary artery disease status post PCI in the mid LAD  2.  LV dysfunction with anteroapical hypokinesis unchanged  3.  Dyspepsia and GE reflux  4.  Dyslipidemia  5.  Stable angina pectoris        Plan:       Plan at this time is to continue on baby aspirin 81 mg a day, Lipitor 40 mg a day, Coreg 3.125 mg by mouth twice a day losartan 25 mg a day and magnesium oxide Plavix 75 mg daily.  She has not been using her Lasix and potassium on a regular basis and she appears euvolemic at this point continue the same regimen and I will see her back in follow-up in 6 months time unless new symptoms arise in the interim.  CONTINUE ON DIETARY RESTRICTIONS WITH SALT AND FLUID INTAKE.  SHE DOES CORRELATE HER EDEMA WITH DIETARY CHANGES IN SALT INTAKE.      Follow up in about 6 months (around 5/16/2021).

## 2020-11-17 RX ORDER — FAMOTIDINE 40 MG/1
40 TABLET, FILM COATED ORAL DAILY
Qty: 90 TABLET | Refills: 3 | Status: SHIPPED | OUTPATIENT
Start: 2020-11-17 | End: 2021-11-02

## 2020-11-17 NOTE — TELEPHONE ENCOUNTER
----- Message from Boni Almodovar sent at 11/17/2020 10:02 AM CST -----  Contact: pt at [phone]  Type:  RX Refill Request  Who Called:  pt  Refill or New Rx:  refill  RX Name and Strength:  Famotidine 40 mg  How is the patient currently taking it? (ex. 1XDay):  1XDay  Is this a 30 day or 90 day RX:  90  Preferred Pharmacy with phone number:    [facility]/pharmacy #[id number] - [place], [place] - [address]  [address] [place] [address]  Phone: [phone] Fax: [phone]  Local or Mail Order:  Local  Ordering Provider:  Galindo Fields Call Back Number:  [phone]

## 2020-12-03 RX ORDER — NITROGLYCERIN 80 MG/1
1 PATCH TRANSDERMAL DAILY
Qty: 90 PATCH | Refills: 3 | Status: SHIPPED | OUTPATIENT
Start: 2020-12-03 | End: 2021-11-29 | Stop reason: SDUPTHER

## 2020-12-03 NOTE — TELEPHONE ENCOUNTER
----- Message from Ebony Jasmine sent at 12/3/2020 12:15 PM CST -----  Regarding: medication  nitroGLYCERIN 0.4 MG/HR TD PT24 (NITRODUR) 0.4 mg/hr patient need refill on this medication

## 2021-06-03 ENCOUNTER — OFFICE VISIT (OUTPATIENT)
Dept: CARDIOLOGY | Facility: CLINIC | Age: 71
End: 2021-06-03
Payer: MEDICARE

## 2021-06-03 VITALS
HEART RATE: 67 BPM | WEIGHT: 162 LBS | OXYGEN SATURATION: 98 % | DIASTOLIC BLOOD PRESSURE: 84 MMHG | HEIGHT: 68 IN | RESPIRATION RATE: 16 BRPM | BODY MASS INDEX: 24.55 KG/M2 | SYSTOLIC BLOOD PRESSURE: 122 MMHG

## 2021-06-03 DIAGNOSIS — E78.5 DYSLIPIDEMIA: Primary | ICD-10-CM

## 2021-06-03 DIAGNOSIS — I10 ESSENTIAL HYPERTENSION: ICD-10-CM

## 2021-06-03 DIAGNOSIS — I25.10 CORONARY ARTERY DISEASE INVOLVING NATIVE CORONARY ARTERY OF NATIVE HEART WITHOUT ANGINA PECTORIS: ICD-10-CM

## 2021-06-03 PROCEDURE — 99214 PR OFFICE/OUTPT VISIT, EST, LEVL IV, 30-39 MIN: ICD-10-PCS | Mod: S$GLB,,, | Performed by: INTERNAL MEDICINE

## 2021-06-03 PROCEDURE — 99214 OFFICE O/P EST MOD 30 MIN: CPT | Mod: S$GLB,,, | Performed by: INTERNAL MEDICINE

## 2021-06-04 ENCOUNTER — TELEPHONE (OUTPATIENT)
Dept: CARDIOLOGY | Facility: CLINIC | Age: 71
End: 2021-06-04

## 2021-07-01 ENCOUNTER — TELEPHONE (OUTPATIENT)
Dept: CARDIOLOGY | Facility: CLINIC | Age: 71
End: 2021-07-01

## 2021-10-14 ENCOUNTER — TELEPHONE (OUTPATIENT)
Dept: CARDIOLOGY | Facility: CLINIC | Age: 71
End: 2021-10-14

## 2021-11-29 RX ORDER — NITROGLYCERIN 80 MG/1
1 PATCH TRANSDERMAL DAILY
Qty: 90 PATCH | Refills: 3 | Status: SHIPPED | OUTPATIENT
Start: 2021-11-29 | End: 2021-12-09 | Stop reason: SDUPTHER

## 2021-12-09 ENCOUNTER — OFFICE VISIT (OUTPATIENT)
Dept: CARDIOLOGY | Facility: CLINIC | Age: 71
End: 2021-12-09
Payer: MEDICARE

## 2021-12-09 VITALS
WEIGHT: 150 LBS | BODY MASS INDEX: 22.73 KG/M2 | DIASTOLIC BLOOD PRESSURE: 68 MMHG | RESPIRATION RATE: 16 BRPM | SYSTOLIC BLOOD PRESSURE: 122 MMHG | HEIGHT: 68 IN | OXYGEN SATURATION: 98 % | HEART RATE: 71 BPM

## 2021-12-09 DIAGNOSIS — I10 PRIMARY HYPERTENSION: ICD-10-CM

## 2021-12-09 DIAGNOSIS — R63.4 WEIGHT LOSS: ICD-10-CM

## 2021-12-09 DIAGNOSIS — E78.5 DYSLIPIDEMIA: ICD-10-CM

## 2021-12-09 DIAGNOSIS — R07.9 CHEST PAIN, UNSPECIFIED TYPE: ICD-10-CM

## 2021-12-09 DIAGNOSIS — I25.10 CORONARY ARTERY DISEASE INVOLVING NATIVE CORONARY ARTERY OF NATIVE HEART WITHOUT ANGINA PECTORIS: ICD-10-CM

## 2021-12-09 PROCEDURE — 99214 OFFICE O/P EST MOD 30 MIN: CPT | Mod: S$GLB,,, | Performed by: INTERNAL MEDICINE

## 2021-12-09 PROCEDURE — 99214 PR OFFICE/OUTPT VISIT, EST, LEVL IV, 30-39 MIN: ICD-10-PCS | Mod: S$GLB,,, | Performed by: INTERNAL MEDICINE

## 2022-01-03 ENCOUNTER — TELEPHONE (OUTPATIENT)
Dept: CARDIOLOGY | Facility: CLINIC | Age: 72
End: 2022-01-03
Payer: MEDICARE

## 2022-01-03 NOTE — TELEPHONE ENCOUNTER
----- Message from Joaquín Cesar sent at 1/3/2022  1:29 PM CST -----  Contact: dr horton office  Primary dr horton at [facility] clinical notes from 12/9 thanks   Fax to [phone]   Call back [phone]

## 2022-07-24 ENCOUNTER — HOSPITAL ENCOUNTER (EMERGENCY)
Facility: HOSPITAL | Age: 72
Discharge: HOME | End: 2022-07-24
Attending: EMERGENCY MEDICINE
Payer: MEDICARE

## 2022-07-24 VITALS
HEIGHT: 68 IN | RESPIRATION RATE: 13 BRPM | WEIGHT: 145 LBS | OXYGEN SATURATION: 99 % | DIASTOLIC BLOOD PRESSURE: 64 MMHG | BODY MASS INDEX: 21.98 KG/M2 | HEART RATE: 67 BPM | SYSTOLIC BLOOD PRESSURE: 120 MMHG | TEMPERATURE: 98 F

## 2022-07-24 DIAGNOSIS — R07.9 CHEST PAIN: ICD-10-CM

## 2022-07-24 DIAGNOSIS — R07.9 CHEST PAIN, UNSPECIFIED TYPE: Primary | ICD-10-CM

## 2022-07-24 DIAGNOSIS — K44.9 HIATAL HERNIA: ICD-10-CM

## 2022-07-24 LAB
ALBUMIN SERPL BCP-MCNC: 3.7 G/DL (ref 3.5–5.2)
ALP SERPL-CCNC: 80 U/L (ref 55–135)
ALT SERPL W/O P-5'-P-CCNC: 21 U/L (ref 10–44)
ANION GAP SERPL CALC-SCNC: 11 MMOL/L (ref 8–16)
AST SERPL-CCNC: 24 U/L (ref 10–40)
BASOPHILS # BLD AUTO: 0.04 K/UL (ref 0–0.2)
BASOPHILS NFR BLD: 0.5 % (ref 0–1.9)
BILIRUB SERPL-MCNC: 0.9 MG/DL (ref 0.1–1)
BNP SERPL-MCNC: 38 PG/ML (ref 0–99)
BUN SERPL-MCNC: 12 MG/DL (ref 8–23)
CALCIUM SERPL-MCNC: 9.2 MG/DL (ref 8.7–10.5)
CHLORIDE SERPL-SCNC: 103 MMOL/L (ref 95–110)
CO2 SERPL-SCNC: 23 MMOL/L (ref 23–29)
CREAT SERPL-MCNC: 0.9 MG/DL (ref 0.5–1.4)
DIFFERENTIAL METHOD: ABNORMAL
EOSINOPHIL # BLD AUTO: 0.2 K/UL (ref 0–0.5)
EOSINOPHIL NFR BLD: 2.1 % (ref 0–8)
ERYTHROCYTE [DISTWIDTH] IN BLOOD BY AUTOMATED COUNT: 12.5 % (ref 11.5–14.5)
EST. GFR  (AFRICAN AMERICAN): >60 ML/MIN/1.73 M^2
EST. GFR  (NON AFRICAN AMERICAN): >60 ML/MIN/1.73 M^2
GLUCOSE SERPL-MCNC: 124 MG/DL (ref 70–110)
HCT VFR BLD AUTO: 36.7 % (ref 37–48.5)
HGB BLD-MCNC: 12.3 G/DL (ref 12–16)
IMM GRANULOCYTES # BLD AUTO: 0.02 K/UL (ref 0–0.04)
IMM GRANULOCYTES NFR BLD AUTO: 0.3 % (ref 0–0.5)
LYMPHOCYTES # BLD AUTO: 1.7 K/UL (ref 1–4.8)
LYMPHOCYTES NFR BLD: 22.1 % (ref 18–48)
MCH RBC QN AUTO: 29.7 PG (ref 27–31)
MCHC RBC AUTO-ENTMCNC: 33.5 G/DL (ref 32–36)
MCV RBC AUTO: 89 FL (ref 82–98)
MONOCYTES # BLD AUTO: 0.6 K/UL (ref 0.3–1)
MONOCYTES NFR BLD: 7.9 % (ref 4–15)
NEUTROPHILS # BLD AUTO: 5.2 K/UL (ref 1.8–7.7)
NEUTROPHILS NFR BLD: 67.1 % (ref 38–73)
NRBC BLD-RTO: 0 /100 WBC
PLATELET # BLD AUTO: 169 K/UL (ref 150–450)
PMV BLD AUTO: 10 FL (ref 9.2–12.9)
POTASSIUM SERPL-SCNC: 3.9 MMOL/L (ref 3.5–5.1)
PROT SERPL-MCNC: 7.4 G/DL (ref 6–8.4)
RBC # BLD AUTO: 4.14 M/UL (ref 4–5.4)
SODIUM SERPL-SCNC: 137 MMOL/L (ref 136–145)
TROPONIN I SERPL DL<=0.01 NG/ML-MCNC: 0.01 NG/ML (ref 0–0.03)
WBC # BLD AUTO: 7.8 K/UL (ref 3.9–12.7)

## 2022-07-24 PROCEDURE — 71045 XR CHEST AP PORTABLE: ICD-10-PCS | Mod: 26,,, | Performed by: RADIOLOGY

## 2022-07-24 PROCEDURE — 85025 COMPLETE CBC W/AUTO DIFF WBC: CPT | Performed by: EMERGENCY MEDICINE

## 2022-07-24 PROCEDURE — 80053 COMPREHEN METABOLIC PANEL: CPT | Performed by: EMERGENCY MEDICINE

## 2022-07-24 PROCEDURE — 71045 X-RAY EXAM CHEST 1 VIEW: CPT | Mod: TC

## 2022-07-24 PROCEDURE — 25000003 PHARM REV CODE 250: Performed by: EMERGENCY MEDICINE

## 2022-07-24 PROCEDURE — 93010 EKG 12-LEAD: ICD-10-PCS | Mod: ,,, | Performed by: INTERNAL MEDICINE

## 2022-07-24 PROCEDURE — 93005 ELECTROCARDIOGRAM TRACING: CPT

## 2022-07-24 PROCEDURE — 25000242 PHARM REV CODE 250 ALT 637 W/ HCPCS: Performed by: EMERGENCY MEDICINE

## 2022-07-24 PROCEDURE — 99285 EMERGENCY DEPT VISIT HI MDM: CPT | Mod: 25

## 2022-07-24 PROCEDURE — 71045 X-RAY EXAM CHEST 1 VIEW: CPT | Mod: 26,,, | Performed by: RADIOLOGY

## 2022-07-24 PROCEDURE — 93010 ELECTROCARDIOGRAM REPORT: CPT | Mod: ,,, | Performed by: INTERNAL MEDICINE

## 2022-07-24 PROCEDURE — 84484 ASSAY OF TROPONIN QUANT: CPT | Performed by: EMERGENCY MEDICINE

## 2022-07-24 PROCEDURE — 83880 ASSAY OF NATRIURETIC PEPTIDE: CPT | Performed by: EMERGENCY MEDICINE

## 2022-07-24 RX ORDER — NAPROXEN SODIUM 220 MG/1
240 TABLET, FILM COATED ORAL
Status: COMPLETED | OUTPATIENT
Start: 2022-07-24 | End: 2022-07-24

## 2022-07-24 RX ORDER — SODIUM CHLORIDE 9 MG/ML
500 INJECTION, SOLUTION INTRAVENOUS
Status: COMPLETED | OUTPATIENT
Start: 2022-07-24 | End: 2022-07-24

## 2022-07-24 RX ORDER — TRAMADOL HYDROCHLORIDE 50 MG/1
50 TABLET ORAL EVERY 6 HOURS PRN
Qty: 20 TABLET | Refills: 0 | Status: SHIPPED | OUTPATIENT
Start: 2022-07-24 | End: 2023-07-11

## 2022-07-24 RX ORDER — NITROGLYCERIN 0.4 MG/1
0.4 TABLET SUBLINGUAL
Status: COMPLETED | OUTPATIENT
Start: 2022-07-24 | End: 2022-07-24

## 2022-07-24 RX ADMIN — ASPIRIN 243 MG: 81 TABLET, CHEWABLE ORAL at 08:07

## 2022-07-24 RX ADMIN — SODIUM CHLORIDE 500 ML: 9 INJECTION, SOLUTION INTRAVENOUS at 08:07

## 2022-07-24 RX ADMIN — NITROGLYCERIN 0.4 MG: 0.4 TABLET SUBLINGUAL at 09:07

## 2022-07-25 NOTE — ED PROVIDER NOTES
Encounter Date: 7/24/2022       History     Chief Complaint   Patient presents with    Chest Pain     L side chest pain that started at 0700 today. Hx of MI     Pt with hx of CAD s/p stent in 2017 here with c/o left chest aching/pain onset around 0700 today. Pain mild and has been constant all day. She says she has been dealing with this pain off and on for weeks. It does not radiate and is not pleuritic. Mild SOB. She had heaviness with her MI and today's pain is different.     The history is provided by the patient.   Chest Pain  The chest pain is unchanged. The quality of the pain is described as aching and pressure-like. The pain does not radiate. Chest pain is worsened by certain positions. Primary symptoms include shortness of breath. Pertinent negatives for primary symptoms include no fever, no fatigue, no syncope, no cough, no wheezing, no palpitations, no abdominal pain, no nausea, no vomiting, no dizziness and no altered mental status.   Pertinent negatives for associated symptoms include no diaphoresis.   Her past medical history is significant for CAD.   Procedure history is positive for cardiac catheterization, echocardiogram and stress echo.     Review of patient's allergies indicates:   Allergen Reactions    Enalapril     Moxifloxacin     Sulfa (sulfonamide antibiotics)      Past Medical History:   Diagnosis Date    Coronary artery disease     cardiac stent    Hypertension      History reviewed. No pertinent surgical history.  Family History   Problem Relation Age of Onset    No Known Problems Mother     No Known Problems Father      Social History     Tobacco Use    Smoking status: Never Smoker    Smokeless tobacco: Never Used   Substance Use Topics    Alcohol use: Not Currently    Drug use: Not Currently     Review of Systems   Constitutional: Negative for chills, diaphoresis, fatigue and fever.   Respiratory: Positive for shortness of breath. Negative for cough and wheezing.     Cardiovascular: Positive for chest pain. Negative for palpitations, leg swelling and syncope.   Gastrointestinal: Negative for abdominal pain, nausea and vomiting.   Neurological: Negative for dizziness.   All other systems reviewed and are negative.      Physical Exam     Initial Vitals   BP Pulse Resp Temp SpO2   07/24/22 2009 07/24/22 2006 07/24/22 2006 07/24/22 2006 07/24/22 2006   129/75 77 20 97.5 °F (36.4 °C) 96 %      MAP       --                Physical Exam    Nursing note and vitals reviewed.  Constitutional: She appears well-developed and well-nourished. No distress.   Neck: Neck supple. No JVD present.   Normal range of motion.  Cardiovascular: Normal rate, regular rhythm, normal heart sounds and intact distal pulses.   Pulmonary/Chest: Breath sounds normal. She exhibits no tenderness.   Abdominal: Abdomen is soft. She exhibits no distension. There is no abdominal tenderness.   Musculoskeletal:         General: No tenderness or edema. Normal range of motion.      Cervical back: Normal range of motion and neck supple.     Neurological: She is alert and oriented to person, place, and time.   Skin: Skin is warm and dry. Capillary refill takes less than 2 seconds. No rash noted. No erythema. No pallor.         ED Course   Procedures  Labs Reviewed   CBC W/ AUTO DIFFERENTIAL - Abnormal; Notable for the following components:       Result Value    Hematocrit 36.7 (*)     All other components within normal limits   COMPREHENSIVE METABOLIC PANEL - Abnormal; Notable for the following components:    Glucose 124 (*)     All other components within normal limits   TROPONIN I   B-TYPE NATRIURETIC PEPTIDE     EKG Readings: (Independently Interpreted)   Initial Reading: No STEMI. Rhythm: Normal Sinus Rhythm. Ectopy: No Ectopy. Conduction: Normal. ST Segments: Normal ST Segments. T Waves Flipped: AVL. Axis: Left Axis Deviation. Clinical Impression: Normal Sinus Rhythm Other Impression: nonspecific EKG       Imaging  Results          X-Ray Chest AP Portable (In process)                  Medications   aspirin chewable tablet 243 mg (243 mg Oral Given 7/24/22 2057)   nitroGLYCERIN SL tablet 0.4 mg (0.4 mg Sublingual Given 7/24/22 2102)   0.9%  NaCl infusion (500 mLs Intravenous Bolus from Bag 7/24/22 2055)     Medical Decision Making:   Clinical Tests:   Lab Tests: Ordered and Reviewed  Radiological Study: Ordered and Reviewed  Medical Tests: Ordered and Reviewed  ED Management:  Pt presented with atypical chest pain. Neg EKG and troponin. CXR showed hiatal hernia but otherwise normal. I suspect her pain is caused by her hernia but she has appt with her cardiologist on Tues. Return precautions discussed.             ED Course as of 07/24/22 2211   Sun Jul 24, 2022 2135 Pain present since 0700 and troponin neg. She effectively r/o for ACS. I suspect her pain caused by her hiatal hernia.  [DC]      ED Course User Index  [DC] Jaime Lovell Jr., MD             Clinical Impression:   Final diagnoses:  [R07.9] Chest pain  [R07.9] Chest pain, unspecified type (Primary)  [K44.9] Hiatal hernia          ED Disposition Condition    Discharge Stable        ED Prescriptions     Medication Sig Dispense Start Date End Date Auth. Provider    traMADoL (ULTRAM) 50 mg tablet Take 1 tablet (50 mg total) by mouth every 6 (six) hours as needed. 20 tablet 7/24/2022  Jaime Lovell Jr., MD        Follow-up Information     Follow up With Specialties Details Why Contact Info    Jaime Vega MD Family Medicine Schedule an appointment as soon as possible for a visit   [address] [place] [address] [phone]             Jaime Lovell Jr., MD  07/24/22 2211

## 2022-07-26 ENCOUNTER — OFFICE VISIT (OUTPATIENT)
Dept: CARDIOLOGY | Facility: CLINIC | Age: 72
End: 2022-07-26
Payer: MEDICARE

## 2022-07-26 VITALS
DIASTOLIC BLOOD PRESSURE: 84 MMHG | BODY MASS INDEX: 21.98 KG/M2 | RESPIRATION RATE: 16 BRPM | SYSTOLIC BLOOD PRESSURE: 136 MMHG | OXYGEN SATURATION: 98 % | HEIGHT: 68 IN | WEIGHT: 145 LBS | HEART RATE: 67 BPM

## 2022-07-26 DIAGNOSIS — I10 PRIMARY HYPERTENSION: ICD-10-CM

## 2022-07-26 DIAGNOSIS — E78.5 DYSLIPIDEMIA: ICD-10-CM

## 2022-07-26 DIAGNOSIS — R07.89 OTHER CHEST PAIN: ICD-10-CM

## 2022-07-26 DIAGNOSIS — I25.10 CORONARY ARTERY DISEASE INVOLVING NATIVE CORONARY ARTERY OF NATIVE HEART WITHOUT ANGINA PECTORIS: ICD-10-CM

## 2022-07-26 PROCEDURE — 99214 OFFICE O/P EST MOD 30 MIN: CPT | Mod: S$GLB,,, | Performed by: INTERNAL MEDICINE

## 2022-07-26 PROCEDURE — 99214 PR OFFICE/OUTPT VISIT, EST, LEVL IV, 30-39 MIN: ICD-10-PCS | Mod: S$GLB,,, | Performed by: INTERNAL MEDICINE

## 2022-07-26 RX ORDER — PANTOPRAZOLE SODIUM 40 MG/1
40 TABLET, DELAYED RELEASE ORAL DAILY
Qty: 30 TABLET | Refills: 11 | Status: SHIPPED | OUTPATIENT
Start: 2022-07-26 | End: 2022-10-04

## 2022-07-26 NOTE — ASSESSMENT & PLAN NOTE
Chest pains atypical nature.  Will add Protonix to her regimen continue on other conservative treatments including Pepcid.  I have encouraged her to use Protonix managed has a flare up.

## 2022-07-26 NOTE — ASSESSMENT & PLAN NOTE
Blood pressure is stable on the present therapy including losartan at 25 mg daily in addition to call Coreg at 3.125 b.i.d. and maintain on low-salt diet

## 2022-07-26 NOTE — ASSESSMENT & PLAN NOTE
Continue on same therapy including isosorbide mononitrate 30 mg daily and enteric-coated aspirin along with Coreg 3.125 mg p.o. b.i.d. and Lipitor 40 mg a day

## 2022-07-26 NOTE — ASSESSMENT & PLAN NOTE
Maintain on Lipitor 40 mg recheck her labs goal is to keep her LDL below 70. Continue on low-fat low-cholesterol diet

## 2022-07-26 NOTE — PROGRESS NOTES
Subjective:    Patient ID:  Holly French is a 72 y.o. female patient here for evaluation Hiatal Hernia (She was in the ED recently with pains)      History of Present Illness:     Patient is a 72-year-old lady who has a symptoms of hiatal hernia had developed intense pain more recently was seen in the emergency room at [facility] and she was diagnosed with hiatal hernia and reflux symptoms with exacerbation and she was treated conservatively subsequently discharged home.  Since then she is doing better she does have reflux symptoms.  No occurrence of any arm neck or jaw pain noted.  Cardiac troponins have been negative.  No blood in the stools no black stools.  No cough or congestion no fevers chills        Review of patient's allergies indicates:   Allergen Reactions    Enalapril     Moxifloxacin     Sulfa (sulfonamide antibiotics)        Past Medical History:   Diagnosis Date    Coronary artery disease     cardiac stent    Hypertension      History reviewed. No pertinent surgical history.  Social History     Tobacco Use    Smoking status: Never Smoker    Smokeless tobacco: Never Used   Substance Use Topics    Alcohol use: Not Currently    Drug use: Not Currently        Review of Systems:    As noted in HPI in addition      REVIEW OF SYSTEMS  CARDIOVASCULAR: No recent chest pain, palpitations, arm, neck, or jaw pain  RESPIRATORY: No recent fever, cough chills, SOB or congestion  : No blood in the urine  GI: No Nausea, vomiting, constipation, diarrhea, symptoms of acid reflux is present  MUSCULOSKELETAL: No myalgias  NEURO: No lightheadedness or dizziness  EYES: No Double vision, blurry, vision or headache              Objective        Vitals:    07/26/22 1044   BP: 136/84   Pulse: 67   Resp: 16       LIPIDS - LAST 2   Lab Results   Component Value Date    CHOL 114 (L) 07/30/2020    HDL 46 07/30/2020    LDLCALC 49.4 (L) 07/30/2020    TRIG 93 07/30/2020    CHOLHDL 40.4 07/30/2020       CBC  - LAST 2  Lab Results   Component Value Date    WBC 7.80 07/24/2022    WBC 9.35 07/30/2020    RBC 4.14 07/24/2022    RBC 3.98 (L) 07/30/2020    HGB 12.3 07/24/2022    HGB 11.8 (L) 07/30/2020    HCT 36.7 (L) 07/24/2022    HCT 36.4 (L) 07/30/2020    MCV 89 07/24/2022    MCV 92 07/30/2020    MCH 29.7 07/24/2022    MCH 29.6 07/30/2020    MCHC 33.5 07/24/2022    MCHC 32.4 07/30/2020    RDW 12.5 07/24/2022    RDW 12.5 07/30/2020     07/24/2022     07/30/2020    MPV 10.0 07/24/2022    MPV 10.4 07/30/2020    GRAN 5.2 07/24/2022    GRAN 67.1 07/24/2022    LYMPH 1.7 07/24/2022    LYMPH 22.1 07/24/2022    MONO 0.6 07/24/2022    MONO 7.9 07/24/2022    BASO 0.04 07/24/2022    BASO 0.04 07/30/2020    NRBC 0 07/24/2022    NRBC 0 07/30/2020       CHEMISTRY & LIVER FUNCTION - LAST 2  Lab Results   Component Value Date     07/24/2022     07/30/2020    K 3.9 07/24/2022    K 4.1 07/30/2020     07/24/2022     07/30/2020    CO2 23 07/24/2022    CO2 27 07/30/2020    ANIONGAP 11 07/24/2022    ANIONGAP 8 07/30/2020    BUN 12 07/24/2022    BUN 12 07/30/2020    CREATININE 0.9 07/24/2022    CREATININE 0.8 07/30/2020     (H) 07/24/2022     (H) 07/30/2020    CALCIUM 9.2 07/24/2022    CALCIUM 9.0 07/30/2020    ALBUMIN 3.7 07/24/2022    ALBUMIN 3.6 07/30/2020    PROT 7.4 07/24/2022    PROT 7.0 07/30/2020    ALKPHOS 80 07/24/2022    ALKPHOS 78 07/30/2020    ALT 21 07/24/2022    ALT 19 07/30/2020    AST 24 07/24/2022    AST 23 07/30/2020    BILITOT 0.9 07/24/2022    BILITOT 0.6 07/30/2020        CARDIAC PROFILE - LAST 2  Lab Results   Component Value Date    BNP 38 07/24/2022    BNP 92 07/30/2020    TROPONINI 0.007 07/24/2022    TROPONINI 0.030 07/30/2020        COAGULATION - LAST 2  No results found for: LABPT, INR, APTT    ENDOCRINE & PSA - LAST 2  Lab Results   Component Value Date    HGBA1C 5.7 07/30/2020        ECHOCARDIOGRAM RESULTS  No results found for this or any previous  visit.      CURRENT/PREVIOUS VISIT EKG  Results for orders placed or performed during the hospital encounter of 07/24/22   EKG 12-lead    Collection Time: 07/24/22  8:02 PM    Narrative    Test Reason : R07.9,    Vent. Rate : 070 BPM     Atrial Rate : 070 BPM     P-R Int : 188 ms          QRS Dur : 088 ms      QT Int : 378 ms       P-R-T Axes : 059 -42 059 degrees     QTc Int : 408 ms    Normal sinus rhythm  Left axis deviation  Minimal voltage criteria for LVH, may be normal variant  Possible Lateral infarct (cited on or before 30-JUL-2020)  Abnormal ECG  When compared with ECG of 30-JUL-2020 09:24,  T wave inversion less evident in Lateral leads  QT has lengthened  Confirmed by Oumar Aldana MD ([id number]) on 7/25/2022 11:25:33 AM    Referred By: JANICE   SELF           Confirmed By:Oumar Aldana MD     No valid procedures specified.   No results found for this or any previous visit.    No valid procedures specified.    PHYSICAL EXAM  CONSTITUTIONAL: Well built, well nourished in no apparent distress  NECK: no carotid bruit, no JVD  LUNGS: CTA  CHEST WALL: no tenderness  HEART: regular rate and rhythm, S1, S2 normal, no murmur, click, rub or gallop   ABDOMEN: soft, non-tender; bowel sounds normal; no masses,  no organomegaly  EXTREMITIES: Extremities normal, no edema, no calf tenderness noted  NEURO: AAO X 3    I HAVE REVIEWED :    The vital signs, nurses notes, and all the pertinent radiology and labs.        Current Outpatient Medications   Medication Instructions    aspirin 81 mg, Oral, Daily    atorvastatin (LIPITOR) 40 MG tablet TAKE 1 TABLET BY MOUTH EVERY DAY IN THE EVENING    carvediloL (COREG) 3.125 MG tablet TAKE 1 TABLET BY MOUTH TWICE A DAY WITH MEALS    coenzyme Q10 200 mg, Oral, Daily    famotidine (PEPCID) 40 MG tablet TAKE 1 TABLET BY MOUTH EVERY DAY    isosorbide mononitrate (IMDUR) 30 MG 24 hr tablet TAKE 1 TABLET BY MOUTH EVERY DAY    losartan (COZAAR) 25 MG tablet TAKE 1 TABLET BY MOUTH EVERYDAY AT  BEDTIME    magnesium oxide (MAG-OX) 400 mg (241.3 mg magnesium) tablet TAKE 1 TABLET BY MOUTH EVERY DAY    nitroGLYCERIN 0.4 MG/HR TD PT24 (NITRODUR) 0.4 mg/hr 1 patch, Transdermal, Daily    pantoprazole (PROTONIX) 40 mg, Oral, Daily    traMADoL (ULTRAM) 50 mg, Oral, Every 6 hours PRN          Assessment & Plan     Chest pain  Chest pains atypical nature.  Will add Protonix to her regimen continue on other conservative treatments including Pepcid.  I have encouraged her to use Protonix managed has a flare up.    Coronary artery disease  Continue on same therapy including isosorbide mononitrate 30 mg daily and enteric-coated aspirin along with Coreg 3.125 mg p.o. b.i.d. and Lipitor 40 mg a day    Dyslipidemia  Maintain on Lipitor 40 mg recheck her labs goal is to keep her LDL below 70. Continue on low-fat low-cholesterol diet    Hypertension  Blood pressure is stable on the present therapy including losartan at 25 mg daily in addition to call Coreg at 3.125 b.i.d. and maintain on low-salt diet          Follow up in about 3 months (around 10/26/2022).

## 2022-07-29 ENCOUNTER — TELEPHONE (OUTPATIENT)
Dept: CARDIOLOGY | Facility: CLINIC | Age: 72
End: 2022-07-29
Payer: MEDICARE

## 2022-07-29 NOTE — TELEPHONE ENCOUNTER
----- Message from Clarisse Medina sent at 7/29/2022 11:33 AM CDT -----  Contact: JASPAL CHAKRABORTY [[id number]]  GENERAL CALL BACK    Patient was recently prescribed pantoprazole (PROTONIX) 40 MG tablet by Dr. Medley. Patient called indicating she has taken this drug in the past, but had to discontinue due to diarrhea. She has not started it yet, and requests a phone call to discuss alternatives.     Contact Preference: Phone call [phone]

## 2022-08-30 ENCOUNTER — TELEPHONE (OUTPATIENT)
Dept: CARDIOLOGY | Facility: CLINIC | Age: 72
End: 2022-08-30
Payer: MEDICARE

## 2022-08-30 ENCOUNTER — NURSE TRIAGE (OUTPATIENT)
Dept: ADMINISTRATIVE | Facility: CLINIC | Age: 72
End: 2022-08-30
Payer: MEDICARE

## 2022-08-30 NOTE — TELEPHONE ENCOUNTER
OOC Rn  Patient Calling about stress test at 0700 in the morning. Tomorrow.   Losartan,  Instructions on medications to take before the test.   Usually takes at night.     Dr. Medley.  Wants to if she should take her losartan,  Called Dr. Medley's office.  Marcy to call back pt in reference to medicaiton    Reason for Disposition   Nursing judgment   Information only question and nurse able to answer    Protocols used: Information Only Call - No Triage-A-OH, No Protocol Available - Information Only-A-OH

## 2022-08-30 NOTE — TELEPHONE ENCOUNTER
Patient advised, test will be at [facility] ([address]   Will need to register on the first floor at the main entrance.   Patient advised that arrival time is 7:00am.  Patient advised that she may be here about 3.5-4 hours, and may want to bring something to occupy their time, as there will be periods of waiting.    Patient advised, may take her medications prior to testing if you need to.  Patient should HOLD Isosorbide and Coreg. Advised if she needs to eat to take her medications, please keep it light, like toast and juice.    Patient advised to avoid all caffeine 12 hours prior to testing.  This includes decaf tea and coffee.    Will provide peanut butter crackers for a snack after stress test.  If patient would prefer something else, please bring a snack from home.    Wear comfortable clothing.   No lotions, oils, or powders to the upper chest area. May wear deodorant.    No metal jewelry, buttons, or zippers to the upper body.  Patient verbalizes understanding of instructions.

## 2022-08-31 ENCOUNTER — HOSPITAL ENCOUNTER (OUTPATIENT)
Dept: CARDIOLOGY | Facility: HOSPITAL | Age: 72
Discharge: HOME | End: 2022-08-31
Attending: INTERNAL MEDICINE
Payer: MEDICARE

## 2022-08-31 ENCOUNTER — HOSPITAL ENCOUNTER (OUTPATIENT)
Dept: RADIOLOGY | Facility: HOSPITAL | Age: 72
Discharge: HOME | End: 2022-08-31
Attending: INTERNAL MEDICINE
Payer: MEDICARE

## 2022-08-31 DIAGNOSIS — I25.10 CORONARY ARTERY DISEASE INVOLVING NATIVE CORONARY ARTERY OF NATIVE HEART WITHOUT ANGINA PECTORIS: ICD-10-CM

## 2022-08-31 DIAGNOSIS — R07.89 OTHER CHEST PAIN: ICD-10-CM

## 2022-08-31 DIAGNOSIS — R07.89 OTHER CHEST PAIN: Primary | ICD-10-CM

## 2022-08-31 DIAGNOSIS — I10 PRIMARY HYPERTENSION: ICD-10-CM

## 2022-08-31 DIAGNOSIS — E78.5 DYSLIPIDEMIA: ICD-10-CM

## 2022-08-31 PROCEDURE — 93018 STRESS TEST WITH MYOCARDIAL PERFUSION (CUPID ONLY): ICD-10-PCS | Mod: ,,, | Performed by: INTERNAL MEDICINE

## 2022-08-31 PROCEDURE — A9502 TC99M TETROFOSMIN: HCPCS

## 2022-08-31 PROCEDURE — 93018 CV STRESS TEST I&R ONLY: CPT | Mod: ,,, | Performed by: INTERNAL MEDICINE

## 2022-08-31 PROCEDURE — 78452 HT MUSCLE IMAGE SPECT MULT: CPT | Mod: 26,,, | Performed by: INTERNAL MEDICINE

## 2022-08-31 PROCEDURE — 93016 CV STRESS TEST SUPVJ ONLY: CPT | Mod: ,,, | Performed by: NURSE PRACTITIONER

## 2022-08-31 PROCEDURE — 93016 PR CARDIAC STRESS TST,DR SUPERV ONLY: ICD-10-PCS | Mod: ,,, | Performed by: NURSE PRACTITIONER

## 2022-08-31 PROCEDURE — 78452 STRESS TEST WITH MYOCARDIAL PERFUSION (CUPID ONLY): ICD-10-PCS | Mod: 26,,, | Performed by: INTERNAL MEDICINE

## 2022-09-01 LAB
CV STRESS BASE HR: 61 BPM
DIASTOLIC BLOOD PRESSURE: 76 MMHG
EJECTION FRACTION- HIGH: 59 %
END DIASTOLIC INDEX-HIGH: 155 ML/M2
END DIASTOLIC INDEX-LOW: 91 ML/M2
END SYSTOLIC INDEX-HIGH: 78 ML/M2
END SYSTOLIC INDEX-LOW: 40 ML/M2
NUC STRESS DIASTOLIC VOLUME INDEX: 124
NUC STRESS EJECTION FRACTION: 39 %
NUC STRESS SYSTOLIC VOLUME INDEX: 76
OHS CV CPX 1 MINUTE RECOVERY HEART RATE: 108 BPM
OHS CV CPX 85 PERCENT MAX PREDICTED HEART RATE MALE: 120
OHS CV CPX ESTIMATED METS: 7
OHS CV CPX MAX PREDICTED HEART RATE: 141
OHS CV CPX PATIENT IS FEMALE: 1
OHS CV CPX PATIENT IS MALE: 0
OHS CV CPX PEAK DIASTOLIC BLOOD PRESSURE: 74 MMHG
OHS CV CPX PEAK HEAR RATE: 127 BPM
OHS CV CPX PEAK RATE PRESSURE PRODUCT: NORMAL
OHS CV CPX PEAK SYSTOLIC BLOOD PRESSURE: 142 MMHG
OHS CV CPX PERCENT MAX PREDICTED HEART RATE ACHIEVED: 90
OHS CV CPX RATE PRESSURE PRODUCT PRESENTING: 7930
RETIRED EF AND QEF - SEE NOTES: 47 %
STRESS ECHO POST EXERCISE DUR MIN: 4 MINUTES
STRESS ECHO POST EXERCISE DUR SEC: 2 SECONDS
SYSTOLIC BLOOD PRESSURE: 130 MMHG

## 2022-09-02 ENCOUNTER — TELEPHONE (OUTPATIENT)
Dept: CARDIOLOGY | Facility: CLINIC | Age: 72
End: 2022-09-02
Payer: MEDICARE

## 2022-09-02 NOTE — TELEPHONE ENCOUNTER
----- Message from Jaime Motley sent at 9/2/2022  2:35 PM CDT -----  Type:  Sooner Appointment Request    Caller is requesting a sooner appointment.  Caller declined first available appointment listed below.  Caller will not accept being placed on the waitlist and is requesting a message be sent to doctor.    Name of Caller:  Pt    When is the first available appointment?       Symptoms:  Letter for F/U    Best Call Back Number:  [phone]     Additional Information:  Please advise -- Thank You

## 2022-09-06 NOTE — PROGRESS NOTES
Stress shows an old MI, Nothing reversible  If no symptoms patient can continue current treatment plan

## 2022-09-07 ENCOUNTER — TELEPHONE (OUTPATIENT)
Dept: CARDIOLOGY | Facility: CLINIC | Age: 72
End: 2022-09-07
Payer: MEDICARE

## 2022-09-07 NOTE — TELEPHONE ENCOUNTER
----- Message from Jaime Motley sent at 9/7/2022  9:09 AM CDT -----  Type:  Sooner Appointment Request     Caller is requesting a sooner appointment.  Caller declined first available appointment listed below.  Caller will not accept being placed on the waitlist and is requesting a message be sent to doctor.     Name of Caller:  Pt     When is the first available appointment?        Symptoms:  Letter for F/U     Best Call Back Number:  [phone]      Additional Information:  Please advise -- Thank You

## 2022-09-12 ENCOUNTER — TELEPHONE (OUTPATIENT)
Dept: CARDIOLOGY | Facility: CLINIC | Age: 72
End: 2022-09-12
Payer: MEDICARE

## 2022-09-12 NOTE — TELEPHONE ENCOUNTER
----- Message from Deidre Starr NP sent at 9/6/2022  3:30 PM CDT -----  Stress shows an old MI, Nothing reversible  If no symptoms patient can continue current treatment plan

## 2022-10-04 ENCOUNTER — OFFICE VISIT (OUTPATIENT)
Dept: CARDIOLOGY | Facility: CLINIC | Age: 72
End: 2022-10-04
Payer: MEDICARE

## 2022-10-04 VITALS
SYSTOLIC BLOOD PRESSURE: 112 MMHG | HEART RATE: 64 BPM | DIASTOLIC BLOOD PRESSURE: 70 MMHG | WEIGHT: 141.13 LBS | BODY MASS INDEX: 21.39 KG/M2 | HEIGHT: 68 IN

## 2022-10-04 DIAGNOSIS — K44.9 HIATAL HERNIA: ICD-10-CM

## 2022-10-04 DIAGNOSIS — E78.5 DYSLIPIDEMIA: ICD-10-CM

## 2022-10-04 DIAGNOSIS — K21.9 GASTROESOPHAGEAL REFLUX DISEASE, UNSPECIFIED WHETHER ESOPHAGITIS PRESENT: ICD-10-CM

## 2022-10-04 DIAGNOSIS — R07.89 OTHER CHEST PAIN: ICD-10-CM

## 2022-10-04 DIAGNOSIS — I25.10 CORONARY ARTERY DISEASE INVOLVING NATIVE CORONARY ARTERY OF NATIVE HEART WITHOUT ANGINA PECTORIS: Primary | ICD-10-CM

## 2022-10-04 DIAGNOSIS — I10 PRIMARY HYPERTENSION: ICD-10-CM

## 2022-10-04 PROCEDURE — 99214 PR OFFICE/OUTPT VISIT, EST, LEVL IV, 30-39 MIN: ICD-10-PCS | Mod: S$GLB,,, | Performed by: NURSE PRACTITIONER

## 2022-10-04 PROCEDURE — 99214 OFFICE O/P EST MOD 30 MIN: CPT | Mod: S$GLB,,, | Performed by: NURSE PRACTITIONER

## 2022-10-04 NOTE — PROGRESS NOTES
Subjective:    Patient ID:  Holly French is a 72 y.o. female   Chief Complaint   Patient presents with    Coronary Artery Disease    Hypertension       HPI:  Patient seen today for follow-up appointment.  She continues to have some intermittent epigastric discomfort and states that her symptoms are primarily relieved with Gas-X.  She also takes Pepcid Complete intermittently.  She denies any exertional chest discomfort or shortness of breath.        Review of patient's allergies indicates:   Allergen Reactions    Protonix [pantoprazole] Diarrhea    Enalapril     Moxifloxacin     Sulfa (sulfonamide antibiotics)        Past Medical History:   Diagnosis Date    Coronary artery disease     cardiac stent    Hypertension      History reviewed. No pertinent surgical history.  Social History     Tobacco Use    Smoking status: Never    Smokeless tobacco: Never   Substance Use Topics    Alcohol use: Not Currently    Drug use: Not Currently     Family History   Problem Relation Age of Onset    No Known Problems Mother     No Known Problems Father         Review of Systems:   Constitution: Negative for diaphoresis and fever.   HEENT: Negative for nosebleeds.    Cardiovascular: Negative for exertional chest pain       No dyspnea on exertion       No leg swelling        No palpitations  Respiratory: Negative for shortness of breath and wheezing.    Hematologic/Lymphatic: Negative for bleeding problem. Does not bruise/bleed easily.   Skin: Negative for color change and rash.   Musculoskeletal: Negative for falls and myalgias.   Gastrointestinal: Negative for hematemesis and hematochezia.   + for intermittent reflux/fullness in chest: relieved with gas x   Genitourinary: Negative for hematuria.   Neurological: Negative for dizziness and light-headedness.   Psychiatric/Behavioral: Negative for altered mental status and memory loss.          Objective:        Vitals:    10/04/22 1440   BP: 112/70   Pulse: 64       Lab Results    Component Value Date    WBC 7.80 07/24/2022    HGB 12.3 07/24/2022    HCT 36.7 (L) 07/24/2022     07/24/2022    CHOL 114 (L) 07/30/2020    TRIG 93 07/30/2020    HDL 46 07/30/2020    ALT 21 07/24/2022    AST 24 07/24/2022     07/24/2022    K 3.9 07/24/2022     07/24/2022    CREATININE 0.9 07/24/2022    BUN 12 07/24/2022    CO2 23 07/24/2022    HGBA1C 5.7 07/30/2020        ECHOCARDIOGRAM RESULTS  No results found for this or any previous visit.        CURRENT/PREVIOUS VISIT EKG  Results for orders placed or performed during the hospital encounter of 07/24/22   EKG 12-lead    Collection Time: 07/24/22  8:02 PM    Narrative    Test Reason : R07.9,    Vent. Rate : 070 BPM     Atrial Rate : 070 BPM     P-R Int : 188 ms          QRS Dur : 088 ms      QT Int : 378 ms       P-R-T Axes : 059 -42 059 degrees     QTc Int : 408 ms    Normal sinus rhythm  Left axis deviation  Minimal voltage criteria for LVH, may be normal variant  Possible Lateral infarct (cited on or before 30-JUL-2020)  Abnormal ECG  When compared with ECG of 30-JUL-2020 09:24,  T wave inversion less evident in Lateral leads  QT has lengthened  Confirmed by Oumar Aldana MD ([id number]) on 7/25/2022 11:25:33 AM    Referred By: [email or web address]   SELF           Confirmed By:Oumar Aldana MD     No valid procedures specified.   Results for orders placed during the hospital encounter of 08/31/22    Nuclear Stress - Cardiology Interpreted    Interpretation Summary    Abnormal myocardial perfusion scan.    There are two significant perfusion abnormalities.    Perfusion Abnormality #1 - There is a severe intensity, large sized, fixed perfusion abnormality consistent with scar in the mid to apical apical, anteroapical, inferoapical and apical septal wall(s) in the typical distribution of the LAD territory.    Perfusion Abnormality #2 - There is a small to moderate sized, moderate to severe intensity, fixed perfusion abnormality consistent with scar in the  inferolateral wall(s) in the typical distribution of the OM2 territory.    There are no other significant perfusion abnormalities.    The gated perfusion images showed an ejection fraction of 39% post stress. Normal ejection fraction is greater than 47%.    LV cavity size is  and normal at stress.    The EKG portion of this study is negative for ischemia.    The patient reported no chest pain during the stress test.    There were no arrhythmias during stress.      Physical Exam:  CONSTITUTIONAL: No fever, no chills  HEENT: Normocephalic, atraumatic,pupils reactive to light                 NECK:  No JVD no carotid bruit  CVS: S1S2+, RRR   LUNGS: Clear  ABDOMEN: Soft, NT, BS+  EXTREMITIES: No cyanosis, edema  : No hurtado catheter  NEURO: AAO X 3  PSY: Normal affect      Medication List with Changes/Refills   Current Medications    ASPIRIN 81 MG CHEW    Take 81 mg by mouth once daily.    ATORVASTATIN (LIPITOR) 40 MG TABLET    TAKE 1 TABLET BY MOUTH EVERY DAY IN THE EVENING    CARVEDILOL (COREG) 3.125 MG TABLET    TAKE 1 TABLET BY MOUTH TWICE A DAY WITH MEALS    COENZYME Q10 200 MG CAPSULE    Take 200 mg by mouth once daily.    FAMOTIDINE (PEPCID) 40 MG TABLET    TAKE 1 TABLET BY MOUTH EVERY DAY    ISOSORBIDE MONONITRATE (IMDUR) 30 MG 24 HR TABLET    TAKE 1 TABLET BY MOUTH EVERY DAY    LOSARTAN (COZAAR) 25 MG TABLET    TAKE 1 TABLET BY MOUTH EVERYDAY AT BEDTIME    MAGNESIUM OXIDE (MAG-OX) 400 MG (241.3 MG MAGNESIUM) TABLET    TAKE 1 TABLET BY MOUTH EVERY DAY    NITROGLYCERIN 0.4 MG/HR TD PT24 (NITRODUR) 0.4 MG/HR    PLACE 1 PATCH ONTO THE SKIN ONCE DAILY.    TRAMADOL (ULTRAM) 50 MG TABLET    Take 1 tablet (50 mg total) by mouth every 6 (six) hours as needed.   Discontinued Medications    PANTOPRAZOLE (PROTONIX) 40 MG TABLET    Take 1 tablet (40 mg total) by mouth once daily.             Assessment:       1. Coronary artery disease involving native coronary artery of native heart without angina pectoris    2. Primary  hypertension    3. Dyslipidemia    4. Other chest pain    5. Gastroesophageal reflux disease, unspecified whether esophagitis present    6. Hiatal hernia         Plan:     Problem List Items Addressed This Visit          Unprioritized    Chest pain    Current Assessment & Plan     Chest pain is atypical.  She did have a nuclear stress test was was negative for reversible ischemia.  Would like to check a 2D echocardiogram to evaluate LV function and valves.         Relevant Orders    Echo    Coronary artery disease - Primary    Current Assessment & Plan     Stress test in august was negative for reversible ischemia, and she has no exertional chest pain.     Continue Imdur 30 mg p.o. daily, atorvastatin 40 mg p.o. daily, aspirin 81 mg p.o. daily.  Goal for LDL is less than 70.  She is at goal on recent labs in July 2022.         Relevant Orders    Echo    Hypertension    Current Assessment & Plan     Blood pressure stable on current regimen.  Continue the same.         Dyslipidemia    Current Assessment & Plan     LDL is at goal.  Continue atorvastatin 40 mg p.o. q.h.s..         GERD (gastroesophageal reflux disease)    Current Assessment & Plan     She follows with her primary care provider as well as Gastroenterology.         Hiatal hernia       Follow up in about 6 months (around 4/4/2023).

## 2022-10-04 NOTE — ASSESSMENT & PLAN NOTE
Stress test in august was negative for reversible ischemia, and she has no exertional chest pain.     Continue Imdur 30 mg p.o. daily, atorvastatin 40 mg p.o. daily, aspirin 81 mg p.o. daily.  Goal for LDL is less than 70.  She is at goal on recent labs in July 2022.

## 2022-10-04 NOTE — ASSESSMENT & PLAN NOTE
Chest pain is atypical.  She did have a nuclear stress test was was negative for reversible ischemia.  Would like to check a 2D echocardiogram to evaluate LV function and valves.

## 2022-10-05 ENCOUNTER — TELEPHONE (OUTPATIENT)
Dept: CARDIOLOGY | Facility: CLINIC | Age: 72
End: 2022-10-05
Payer: MEDICARE

## 2022-10-06 ENCOUNTER — TELEPHONE (OUTPATIENT)
Dept: CARDIOLOGY | Facility: CLINIC | Age: 72
End: 2022-10-06
Payer: MEDICARE

## 2022-10-06 NOTE — TELEPHONE ENCOUNTER
----- Message from Balwinder Gabriel sent at 10/6/2022  8:50 AM CDT -----  Regarding: rescheduling appointment  Contact: patient  Patient want to speak with a nurse regarding rescheduling Echo appointment, please call back at [phone] (home)     Case number [id number]

## 2022-10-07 ENCOUNTER — TELEPHONE (OUTPATIENT)
Dept: CARDIOLOGY | Facility: CLINIC | Age: 72
End: 2022-10-07
Payer: MEDICARE

## 2022-10-07 NOTE — TELEPHONE ENCOUNTER
----- Message from Feli Schwartz sent at 10/7/2022  2:08 PM CDT -----  Regarding: Pt called to speak to the nurse regarding her Echo Appt and would like to know is she can be seen sooner that 6 months for now and would like a call back today  Patient Advice:    Pt called to speak to the nurse regarding her Echo Appt and would like to know is she can be seen sooner that 6 months for now and would like a call back today    Pt can be reached at [phone]

## 2022-10-13 ENCOUNTER — HOSPITAL ENCOUNTER (OUTPATIENT)
Dept: CARDIOLOGY | Facility: HOSPITAL | Age: 72
Discharge: HOME | End: 2022-10-13
Attending: NURSE PRACTITIONER
Payer: MEDICARE

## 2022-10-13 VITALS — WEIGHT: 141 LBS | BODY MASS INDEX: 21.37 KG/M2 | HEIGHT: 68 IN

## 2022-10-13 DIAGNOSIS — I25.10 CORONARY ARTERY DISEASE INVOLVING NATIVE CORONARY ARTERY OF NATIVE HEART WITHOUT ANGINA PECTORIS: ICD-10-CM

## 2022-10-13 DIAGNOSIS — R07.89 OTHER CHEST PAIN: ICD-10-CM

## 2022-10-13 PROCEDURE — 93306 ECHO (CUPID ONLY): ICD-10-PCS | Mod: 26,,, | Performed by: INTERNAL MEDICINE

## 2022-10-13 PROCEDURE — 93306 TTE W/DOPPLER COMPLETE: CPT | Mod: 26,,, | Performed by: INTERNAL MEDICINE

## 2022-10-13 PROCEDURE — 93306 TTE W/DOPPLER COMPLETE: CPT

## 2022-10-18 RX ORDER — CARVEDILOL 3.12 MG/1
3.12 TABLET ORAL 2 TIMES DAILY WITH MEALS
Qty: 180 TABLET | Refills: 3 | Status: SHIPPED | OUTPATIENT
Start: 2022-10-18 | End: 2023-11-28 | Stop reason: SDUPTHER

## 2022-10-18 RX ORDER — CARVEDILOL 3.12 MG/1
3.12 TABLET ORAL 2 TIMES DAILY WITH MEALS
Qty: 180 TABLET | Refills: 3 | Status: SHIPPED | OUTPATIENT
Start: 2022-10-18 | End: 2022-10-18 | Stop reason: SDUPTHER

## 2022-10-18 RX ORDER — LOSARTAN POTASSIUM 25 MG/1
25 TABLET ORAL DAILY
Qty: 90 TABLET | Refills: 3 | Status: SHIPPED | OUTPATIENT
Start: 2022-10-18 | End: 2023-10-12

## 2022-10-18 RX ORDER — ISOSORBIDE MONONITRATE 30 MG/1
30 TABLET, EXTENDED RELEASE ORAL DAILY
Qty: 90 TABLET | Refills: 3 | Status: SHIPPED | OUTPATIENT
Start: 2022-10-18 | End: 2023-10-27

## 2022-10-18 RX ORDER — ATORVASTATIN CALCIUM 40 MG/1
40 TABLET, FILM COATED ORAL NIGHTLY
Qty: 90 TABLET | Refills: 3 | Status: SHIPPED | OUTPATIENT
Start: 2022-10-18 | End: 2022-10-18 | Stop reason: SDUPTHER

## 2022-10-18 RX ORDER — ATORVASTATIN CALCIUM 40 MG/1
40 TABLET, FILM COATED ORAL NIGHTLY
Qty: 90 TABLET | Refills: 3 | Status: SHIPPED | OUTPATIENT
Start: 2022-10-18 | End: 2023-11-28 | Stop reason: SDUPTHER

## 2022-10-18 NOTE — TELEPHONE ENCOUNTER
----- Message from Virginie Penn sent at 10/18/2022 11:25 AM CDT -----  Contact: Patient  [facility] patient prescription not authorize medication    atorvastatin (LIPITOR) 40 MG tablet , carvediloL (COREG) 3.125 MG tablet    Patient stated needs advice to see if needs to continue medication or stop?    [id number] [place], [place] - [address]   Phone:  [phone]  Fax:  [phone]    Patient stated medication running low     Please advice    Patient can be reach at [phone]

## 2022-10-18 NOTE — TELEPHONE ENCOUNTER
----- Message from Tami Reeder sent at 10/18/2022 11:49 AM CDT -----  Pt would like to be called back regarding  medication Authorization on   Rx refill , pt states almost out  meds    Pt can be reached at  [phone]

## 2022-10-21 LAB
AORTIC VALVE CUSP SEPERATION: 2.3 CM
AV INDEX (PROSTH): 1.06
AV MEAN GRADIENT: 5 MMHG
AV PEAK GRADIENT: 5 MMHG
AV REGURGITATION PRESSURE HALF TIME: 676 MS
AV VALVE AREA: 4.03 CM2
AV VELOCITY RATIO: 1.14
BSA FOR ECHO PROCEDURE: 1.75 M2
CV ECHO LV RWT: 0.49 CM
DOP CALC AO PEAK VEL: 1.12 M/S
DOP CALC AO VTI: 23.4 CM
DOP CALC LVOT AREA: 3.8 CM2
DOP CALC LVOT DIAMETER: 2.2 CM
DOP CALC LVOT PEAK VEL: 1.28 M/S
DOP CALC LVOT STROKE VOLUME: 94.23 CM3
DOP CALCLVOT PEAK VEL VTI: 24.8 CM
E WAVE DECELERATION TIME: 201 MS
E/A RATIO: 1.29
E/E' RATIO: 32.5 M/S
ECHO LV POSTERIOR WALL: 0.83 CM (ref 0.6–1.1)
EJECTION FRACTION: 50 %
FRACTIONAL SHORTENING: 30 % (ref 28–44)
INTERVENTRICULAR SEPTUM: 0.79 CM (ref 0.6–1.1)
IVRT: 211 MS
LEFT ATRIUM SIZE: 2.6 CM
LEFT INTERNAL DIMENSION IN SYSTOLE: 2.41 CM (ref 2.1–4)
LEFT VENTRICLE MASS INDEX: 42 G/M2
LEFT VENTRICULAR INTERNAL DIMENSION IN DIASTOLE: 3.42 CM (ref 3.5–6)
LEFT VENTRICULAR MASS: 73.83 G
LV LATERAL E/E' RATIO: 32.5 M/S
LV SEPTAL E/E' RATIO: 32.5 M/S
MV PEAK A VEL: 1.01 M/S
MV PEAK E VEL: 1.3 M/S
MV STENOSIS PRESSURE HALF TIME: 49 MS
MV VALVE AREA P 1/2 METHOD: 4.49 CM2
PISA TR MAX VEL: 2.04 M/S
RA PRESSURE: 3 MMHG
RIGHT VENTRICULAR END-DIASTOLIC DIMENSION: 1.53 CM
TDI LATERAL: 0.04 M/S
TDI SEPTAL: 0.04 M/S
TDI: 0.04 M/S
TR MAX PG: 17 MMHG
TV REST PULMONARY ARTERY PRESSURE: 20 MMHG

## 2022-11-07 ENCOUNTER — TELEPHONE (OUTPATIENT)
Dept: CARDIOLOGY | Facility: CLINIC | Age: 72
End: 2022-11-07
Payer: MEDICARE

## 2022-11-07 NOTE — TELEPHONE ENCOUNTER
----- Message from Candi Mcguire sent at 11/7/2022  7:52 AM CST -----  Regarding: Test Results  Contact: Patient  Type:  Test Results    Who Called: Patient   Name of Test (Lab/Mammo/Etc): TRANSTHORACIC ECHO COMPLETE  Date of Test: 10/13/2022   Ordering Provider:  Chris  Where the test was performed: [facility]  Would the patient rather a call back or a response via [facility]? Call back   Best Call Back Number:  [phone]  Additional Information:  Patient stated she has not heard from anyone regarding results of test please assist

## 2022-11-15 RX ORDER — ATORVASTATIN CALCIUM 40 MG/1
TABLET, FILM COATED ORAL
Qty: 90 TABLET | Refills: 3 | OUTPATIENT
Start: 2022-11-15

## 2022-11-15 RX ORDER — CARVEDILOL 3.12 MG/1
TABLET ORAL
Qty: 180 TABLET | Refills: 3 | OUTPATIENT
Start: 2022-11-15

## 2023-04-10 ENCOUNTER — OFFICE VISIT (OUTPATIENT)
Dept: CARDIOLOGY | Facility: CLINIC | Age: 73
End: 2023-04-10
Payer: MEDICARE

## 2023-04-10 VITALS
RESPIRATION RATE: 16 BRPM | HEART RATE: 64 BPM | HEIGHT: 68 IN | BODY MASS INDEX: 21.52 KG/M2 | SYSTOLIC BLOOD PRESSURE: 112 MMHG | OXYGEN SATURATION: 99 % | WEIGHT: 142 LBS | DIASTOLIC BLOOD PRESSURE: 64 MMHG

## 2023-04-10 DIAGNOSIS — I25.10 CORONARY ARTERY DISEASE INVOLVING NATIVE CORONARY ARTERY OF NATIVE HEART WITHOUT ANGINA PECTORIS: ICD-10-CM

## 2023-04-10 DIAGNOSIS — K21.00 GASTROESOPHAGEAL REFLUX DISEASE WITH ESOPHAGITIS WITHOUT HEMORRHAGE: ICD-10-CM

## 2023-04-10 DIAGNOSIS — I10 PRIMARY HYPERTENSION: ICD-10-CM

## 2023-04-10 DIAGNOSIS — E78.5 DYSLIPIDEMIA: ICD-10-CM

## 2023-04-10 PROCEDURE — 99214 OFFICE O/P EST MOD 30 MIN: CPT | Mod: S$PBB,,, | Performed by: INTERNAL MEDICINE

## 2023-04-10 PROCEDURE — 99999 PR PBB SHADOW E&M-EST. PATIENT-LVL IV: CPT | Mod: PBBFAC,,, | Performed by: INTERNAL MEDICINE

## 2023-04-10 PROCEDURE — 99214 OFFICE O/P EST MOD 30 MIN: CPT | Mod: PBBFAC,PN | Performed by: INTERNAL MEDICINE

## 2023-04-10 PROCEDURE — 99999 PR PBB SHADOW E&M-EST. PATIENT-LVL IV: ICD-10-PCS | Mod: PBBFAC,,, | Performed by: INTERNAL MEDICINE

## 2023-04-10 PROCEDURE — 99214 PR OFFICE/OUTPT VISIT, EST, LEVL IV, 30-39 MIN: ICD-10-PCS | Mod: S$PBB,,, | Performed by: INTERNAL MEDICINE

## 2023-04-10 NOTE — ASSESSMENT & PLAN NOTE
Blood pressure is stable at 1 12/64 mm Hg continue on isosorbide mononitrate 30, losartan 50 and Coreg 3.125 b.i.d..  Low-salt diet

## 2023-04-10 NOTE — ASSESSMENT & PLAN NOTE
Maintain on low-fat low-cholesterol diet and also on Lipitor 40 mg daily  Her last lipid levels are therapeutic   Latest Reference Range & Units 07/30/20 16:33   Cholesterol 120 - 199 mg/dL 114 (L)   HDL 40 - 75 mg/dL 46   HDL/Cholesterol Ratio 20.0 - 50.0 % 40.4   LDL Cholesterol External 63.0 - 159.0 mg/dL 49.4 (L)   Non-HDL Cholesterol mg/dL 68   Total Cholesterol/HDL Ratio 2.0 - 5.0  2.5   Triglycerides 30 - 150 mg/dL 93   (L): Data is abnormally low

## 2023-04-10 NOTE — ASSESSMENT & PLAN NOTE
Stable coronary artery disease continue on risk factor modification  Maintain on aspirin 81, Coreg 3.125 mg p.o. b.i.d. and also on Lipitor 40 mg daily for risk modification.  Maintain on isosorbide mononitrate 30 mg a day continue diet and exercise

## 2023-04-10 NOTE — PROGRESS NOTES
Subjective:    Patient ID:  Holly French is a 73 y.o. female patient here for evaluation Follow-up      History of Present Illness:     73-year-old with history of known coronary artery disease and prior coronary infarction and coronary stent placement arterial hypertension is seeking follow-up evaluation.  She is noted to have some hematuria and had workup for the same the care of Dr. Vega.  She is doing well appetite has been good she lost some weight over the course of time and she is been now holding up steadily.  Denies having any new symptoms of angina arm neck or jaw pain no shortness of breath or edema noted in the lower extremities.  She remains reasonably active        Review of patient's allergies indicates:   Allergen Reactions    Protonix [pantoprazole] Diarrhea    Enalapril     Moxifloxacin     Sulfa (sulfonamide antibiotics)        Past Medical History:   Diagnosis Date    Coronary artery disease     cardiac stent    Hypertension      History reviewed. No pertinent surgical history.  Social History     Tobacco Use    Smoking status: Never    Smokeless tobacco: Never   Substance Use Topics    Alcohol use: Not Currently    Drug use: Not Currently        Review of Systems:    As noted in HPI in addition      REVIEW OF SYSTEMS  CARDIOVASCULAR: No recent chest pain, palpitations, arm, neck, or jaw pain  RESPIRATORY: No recent fever, cough chills, SOB or congestion  : No blood in the urine  GI: No Nausea, vomiting, constipation, she has some dyspeptic symptoms and uses Gas-X on p.r.n. basis.     MUSCULOSKELETAL: No myalgias  NEURO: No lightheadedness or dizziness  EYES: No Double vision, blurry, vision or headache              Objective        Vitals:    04/10/23 1403   BP: 112/64   Pulse: 64   Resp: 16       LIPIDS - LAST 2   Lab Results   Component Value Date    CHOL 114 (L) 07/30/2020    HDL 46 07/30/2020    LDLCALC 49.4 (L) 07/30/2020    TRIG 93 07/30/2020    CHOLHDL 40.4 07/30/2020       CBC -  LAST 2  Lab Results   Component Value Date    WBC 7.80 07/24/2022    WBC 9.35 07/30/2020    RBC 4.14 07/24/2022    RBC 3.98 (L) 07/30/2020    HGB 12.3 07/24/2022    HGB 11.8 (L) 07/30/2020    HCT 36.7 (L) 07/24/2022    HCT 36.4 (L) 07/30/2020    MCV 89 07/24/2022    MCV 92 07/30/2020    MCH 29.7 07/24/2022    MCH 29.6 07/30/2020    MCHC 33.5 07/24/2022    MCHC 32.4 07/30/2020    RDW 12.5 07/24/2022    RDW 12.5 07/30/2020     07/24/2022     07/30/2020    MPV 10.0 07/24/2022    MPV 10.4 07/30/2020    GRAN 5.2 07/24/2022    GRAN 67.1 07/24/2022    LYMPH 1.7 07/24/2022    LYMPH 22.1 07/24/2022    MONO 0.6 07/24/2022    MONO 7.9 07/24/2022    BASO 0.04 07/24/2022    BASO 0.04 07/30/2020    NRBC 0 07/24/2022    NRBC 0 07/30/2020       CHEMISTRY & LIVER FUNCTION - LAST 2  Lab Results   Component Value Date     07/24/2022     07/30/2020    K 3.9 07/24/2022    K 4.1 07/30/2020     07/24/2022     07/30/2020    CO2 23 07/24/2022    CO2 27 07/30/2020    ANIONGAP 11 07/24/2022    ANIONGAP 8 07/30/2020    BUN 12 07/24/2022    BUN 12 07/30/2020    CREATININE 0.9 07/24/2022    CREATININE 0.8 07/30/2020     (H) 07/24/2022     (H) 07/30/2020    CALCIUM 9.2 07/24/2022    CALCIUM 9.0 07/30/2020    ALBUMIN 3.7 07/24/2022    ALBUMIN 3.6 07/30/2020    PROT 7.4 07/24/2022    PROT 7.0 07/30/2020    ALKPHOS 80 07/24/2022    ALKPHOS 78 07/30/2020    ALT 21 07/24/2022    ALT 19 07/30/2020    AST 24 07/24/2022    AST 23 07/30/2020    BILITOT 0.9 07/24/2022    BILITOT 0.6 07/30/2020        CARDIAC PROFILE - LAST 2  Lab Results   Component Value Date    BNP 38 07/24/2022    BNP 92 07/30/2020    TROPONINI 0.007 07/24/2022    TROPONINI 0.030 07/30/2020        COAGULATION - LAST 2  No results found for: LABPT, INR, APTT    ENDOCRINE & PSA - LAST 2  Lab Results   Component Value Date    HGBA1C 5.7 07/30/2020        ECHOCARDIOGRAM RESULTS  Results for orders placed during the hospital encounter of  10/13/22    Echo    Interpretation Summary  · The left ventricle is normal in size with concentric remodeling and low normal systolic function.  · Mid and distal anterior wall, apex and apical lateral wall and apical septum appears severely hypokinetic to akinetic. The remaining walls appear to move fairly well  · The estimated ejection fraction is 50%.  · Indeterminate left ventricular diastolic function.  · Normal right ventricular size with normal right ventricular systolic function.  · There are segmental left ventricular wall motion abnormalities.  · Moderate aortic regurgitation.  · Normal central venous pressure (3 mmHg).  · The estimated PA systolic pressure is 20 mmHg.      CURRENT/PREVIOUS VISIT EKG  Results for orders placed or performed during the hospital encounter of 07/24/22   EKG 12-lead    Collection Time: 07/24/22  8:02 PM    Narrative    Test Reason : R07.9,    Vent. Rate : 070 BPM     Atrial Rate : 070 BPM     P-R Int : 188 ms          QRS Dur : 088 ms      QT Int : 378 ms       P-R-T Axes : 059 -42 059 degrees     QTc Int : 408 ms    Normal sinus rhythm  Left axis deviation  Minimal voltage criteria for LVH, may be normal variant  Possible Lateral infarct (cited on or before 30-JUL-2020)  Abnormal ECG  When compared with ECG of 30-JUL-2020 09:24,  T wave inversion less evident in Lateral leads  QT has lengthened  Confirmed by Oumar Aldana MD ([id number]) on 7/25/2022 11:25:33 AM    Referred By: [email or web address]   SELF           Confirmed By:Oumar Aldana MD     No valid procedures specified.   Results for orders placed during the hospital encounter of 08/31/22    Nuclear Stress - Cardiology Interpreted    Interpretation Summary    Abnormal myocardial perfusion scan.    There are two significant perfusion abnormalities.    Perfusion Abnormality #1 - There is a severe intensity, large sized, fixed perfusion abnormality consistent with scar in the mid to apical apical, anteroapical, inferoapical and apical septal  wall(s) in the typical distribution of the LAD territory.    Perfusion Abnormality #2 - There is a small to moderate sized, moderate to severe intensity, fixed perfusion abnormality consistent with scar in the inferolateral wall(s) in the typical distribution of the OM2 territory.    There are no other significant perfusion abnormalities.    The gated perfusion images showed an ejection fraction of 39% post stress. Normal ejection fraction is greater than 47%.    LV cavity size is  and normal at stress.    The EKG portion of this study is negative for ischemia.    The patient reported no chest pain during the stress test.    There were no arrhythmias during stress.    No valid procedures specified.    PHYSICAL EXAM  CONSTITUTIONAL: Well built, well nourished in no apparent distress  NECK: no carotid bruit, no JVD  LUNGS: CTA  CHEST WALL: no tenderness  HEART: regular rate and rhythm, S1, S2 normal, no murmur, click, rub or gallop   ABDOMEN: soft, non-tender; bowel sounds normal; no masses,  no organomegaly  EXTREMITIES: Extremities normal, no edema, no calf tenderness noted  NEURO: AAO X 3    I HAVE REVIEWED :    The vital signs, nurses notes, and all the pertinent radiology and labs.        Current Outpatient Medications   Medication Instructions    aspirin 81 mg, Oral, Daily    atorvastatin (LIPITOR) 40 mg, Oral, Nightly    carvediloL (COREG) 3.125 mg, Oral, 2 times daily with meals    coenzyme Q10 200 mg, Oral, Daily    famotidine (PEPCID) 40 MG tablet TAKE 1 TABLET BY MOUTH EVERY DAY    isosorbide mononitrate (IMDUR) 30 mg, Oral, Daily    losartan (COZAAR) 25 mg, Oral, Daily    magnesium oxide (MAG-OX) 400 mg (241.3 mg magnesium) tablet TAKE 1 TABLET BY MOUTH EVERY DAY    nitroGLYCERIN 0.4 MG/HR TD PT24 (NITRODUR) 0.4 mg/hr 1 patch, Transdermal, Daily    traMADoL (ULTRAM) 50 mg, Oral, Every 6 hours PRN          Assessment & Plan     Coronary artery disease  Stable coronary artery disease continue on risk factor  modification  Maintain on aspirin 81, Coreg 3.125 mg p.o. b.i.d. and also on Lipitor 40 mg daily for risk modification.  Maintain on isosorbide mononitrate 30 mg a day continue diet and exercise    Hypertension  Blood pressure is stable at 1 12/64 mm Hg continue on isosorbide mononitrate 30, losartan 50 and Coreg 3.125 b.i.d..  Low-salt diet    Dyslipidemia  Maintain on low-fat low-cholesterol diet and also on Lipitor 40 mg daily  Her last lipid levels are therapeutic   Latest Reference Range & Units 07/30/20 16:33   Cholesterol 120 - 199 mg/dL 114 (L)   HDL 40 - 75 mg/dL 46   HDL/Cholesterol Ratio 20.0 - 50.0 % 40.4   LDL Cholesterol External 63.0 - 159.0 mg/dL 49.4 (L)   Non-HDL Cholesterol mg/dL 68   Total Cholesterol/HDL Ratio 2.0 - 5.0  2.5   Triglycerides 30 - 150 mg/dL 93   (L): Data is abnormally low    GERD (gastroesophageal reflux disease)  Continue on famotidine 40 mg daily.  Can use Prilosec on p.r.n. basis          Follow up in about 6 months (around 10/10/2023).

## 2023-07-10 ENCOUNTER — TELEPHONE (OUTPATIENT)
Dept: CARDIOLOGY | Facility: CLINIC | Age: 73
End: 2023-07-10
Payer: MEDICARE

## 2023-07-10 NOTE — TELEPHONE ENCOUNTER
----- Message from Carmen Ty sent at 7/10/2023  8:45 AM CDT -----  Contact: PT  Type:  Needs Medical Advice    Who Called: PT   Symptoms (please be specific): SWOLLEN ANKLES OFF AND ON THROUGHOUT THE DAY , MORNING NUMBNESS AND TINGLING COMES AND GOES IN FEET  How long has patient had these symptoms:  2-3 DAYS   Pharmacy name and phone #:    [id number] - [place], [place] - [address]  [address] [place] [address]  Phone: [phone] Fax: [phone]  Would the patient rather a call back or a response via [facility]? CALL   Best Call Back Number: [phone] (home)   Additional Information: THANK YOU

## 2023-07-11 ENCOUNTER — OFFICE VISIT (OUTPATIENT)
Dept: CARDIOLOGY | Facility: CLINIC | Age: 73
End: 2023-07-11
Payer: MEDICARE

## 2023-07-11 VITALS
HEIGHT: 68 IN | BODY MASS INDEX: 21.67 KG/M2 | HEART RATE: 61 BPM | WEIGHT: 143 LBS | SYSTOLIC BLOOD PRESSURE: 130 MMHG | DIASTOLIC BLOOD PRESSURE: 70 MMHG | OXYGEN SATURATION: 98 %

## 2023-07-11 DIAGNOSIS — I10 PRIMARY HYPERTENSION: ICD-10-CM

## 2023-07-11 DIAGNOSIS — K44.9 HIATAL HERNIA: ICD-10-CM

## 2023-07-11 DIAGNOSIS — R60.0 LOCALIZED EDEMA: ICD-10-CM

## 2023-07-11 DIAGNOSIS — K21.00 GASTROESOPHAGEAL REFLUX DISEASE WITH ESOPHAGITIS WITHOUT HEMORRHAGE: ICD-10-CM

## 2023-07-11 DIAGNOSIS — E78.5 DYSLIPIDEMIA: ICD-10-CM

## 2023-07-11 PROCEDURE — 99999 PR PBB SHADOW E&M-EST. PATIENT-LVL IV: CPT | Mod: PBBFAC,,, | Performed by: NURSE PRACTITIONER

## 2023-07-11 PROCEDURE — 99214 PR OFFICE/OUTPT VISIT, EST, LEVL IV, 30-39 MIN: ICD-10-PCS | Mod: S$PBB,,, | Performed by: NURSE PRACTITIONER

## 2023-07-11 PROCEDURE — 99214 OFFICE O/P EST MOD 30 MIN: CPT | Mod: S$PBB,,, | Performed by: NURSE PRACTITIONER

## 2023-07-11 PROCEDURE — 99999 PR PBB SHADOW E&M-EST. PATIENT-LVL IV: ICD-10-PCS | Mod: PBBFAC,,, | Performed by: NURSE PRACTITIONER

## 2023-07-11 PROCEDURE — 99214 OFFICE O/P EST MOD 30 MIN: CPT | Mod: PBBFAC,PN | Performed by: NURSE PRACTITIONER

## 2023-07-11 RX ORDER — FUROSEMIDE 20 MG/1
20 TABLET ORAL DAILY PRN
Qty: 30 TABLET | Refills: 0 | Status: SHIPPED | OUTPATIENT
Start: 2023-07-11 | End: 2023-08-03

## 2023-07-11 NOTE — ASSESSMENT & PLAN NOTE
Taking pepcid for this, under control  Avoid overeating and avoid lying down for 1 hour after eating/drinking

## 2023-07-11 NOTE — PROGRESS NOTES
Subjective:    Patient ID:  Holly French is a 73 y.o. female patient here for evaluation Leg Swelling (Toes tingle )    History of Present Illness:     C/o bilateral ankle swelling x 4-5 days;   Worsened by 5-6 pm, improved in AM  No calf pain or claudication; no cough, no PND or orthopnea  States has happened to her before and she has history of eating salty potato chips  States she is very active, up and down on her feet a lot  With summer heat has less energy  Walks dog multiple times daily, yard work, cooking, cleans house, shopping    No chest pain, shortness of breath, dizziness  No fluid retention elsewhere  Overdue on FLP      Most Recent Echocardiogram Results  Results for orders placed during the hospital encounter of 10/13/22    Echo    Interpretation Summary  · The left ventricle is normal in size with concentric remodeling and low normal systolic function.  · Mid and distal anterior wall, apex and apical lateral wall and apical septum appears severely hypokinetic to akinetic. The remaining walls appear to move fairly well  · The estimated ejection fraction is 50%.  · Indeterminate left ventricular diastolic function.  · Normal right ventricular size with normal right ventricular systolic function.  · There are segmental left ventricular wall motion abnormalities.  · Moderate aortic regurgitation.  · Normal central venous pressure (3 mmHg).  · The estimated PA systolic pressure is 20 mmHg.      Most Recent Nuclear Stress Test Results  Results for orders placed during the hospital encounter of 08/31/22    Nuclear Stress - Cardiology Interpreted    Interpretation Summary    Abnormal myocardial perfusion scan.    There are two significant perfusion abnormalities.    Perfusion Abnormality #1 - There is a severe intensity, large sized, fixed perfusion abnormality consistent with scar in the mid to apical apical, anteroapical, inferoapical and apical septal wall(s) in the typical distribution of the LAD  territory.    Perfusion Abnormality #2 - There is a small to moderate sized, moderate to severe intensity, fixed perfusion abnormality consistent with scar in the inferolateral wall(s) in the typical distribution of the OM2 territory.    There are no other significant perfusion abnormalities.    The gated perfusion images showed an ejection fraction of 39% post stress. Normal ejection fraction is greater than 47%.    LV cavity size is  and normal at stress.    The EKG portion of this study is negative for ischemia.    The patient reported no chest pain during the stress test.    There were no arrhythmias during stress.      Most Recent Cardiac PET Stress Test Results  No results found for this or any previous visit.      Most Recent Cardiovascular Angiogram results  No results found for this or any previous visit.      Other Most Recent Cardiology Results  Results for orders placed during the hospital encounter of 07/30/20    CARDIAC MONITORING STRIPS      REVIEW OF SYSTEMS: As noted in HPI   CARDIOVASCULAR: No recent chest pain, palpitations, arm/neck/jaw pain, or edema.  RESPIRATORY: No recent fever, cough, SOB.  : No blood in the urine  GI: No reflux, nausea, vomiting, or blood in stool.   MUSCULOSKELETAL: No falls.   NEURO: No headaches, syncope, or dizziness.  EYES: No sudden changes in vision.     Past Medical History:   Diagnosis Date    Coronary artery disease     cardiac stent    Hypertension      History reviewed. No pertinent surgical history.  Social History     Tobacco Use    Smoking status: Never    Smokeless tobacco: Never   Substance Use Topics    Alcohol use: Not Currently    Drug use: Not Currently         Objective      Vitals:    07/11/23 1021   BP: 130/70   Pulse: 61       LAST EKG  Results for orders placed or performed during the hospital encounter of 07/24/22   EKG 12-lead    Collection Time: 07/24/22  8:02 PM    Narrative    Test Reason : R07.9,    Vent. Rate : 070 BPM     Atrial Rate :  070 BPM     P-R Int : 188 ms          QRS Dur : 088 ms      QT Int : 378 ms       P-R-T Axes : 059 -42 059 degrees     QTc Int : 408 ms    Normal sinus rhythm  Left axis deviation  Minimal voltage criteria for LVH, may be normal variant  Possible Lateral infarct (cited on or before 30-JUL-2020)  Abnormal ECG  When compared with ECG of 30-JUL-2020 09:24,  T wave inversion less evident in Lateral leads  QT has lengthened  Confirmed by Oumar Aldana MD ([id number]) on 7/25/2022 11:25:33 AM    Referred By: [email or web address]   SELF           Confirmed By:Oumar Aldana MD     LIPIDS - LAST 2   Lab Results   Component Value Date    CHOL 114 (L) 07/30/2020    HDL 46 07/30/2020    LDLCALC 49.4 (L) 07/30/2020    TRIG 93 07/30/2020    CHOLHDL 40.4 07/30/2020     CARDIAC PROFILE - LAST 2  Lab Results   Component Value Date    BNP 38 07/24/2022    BNP 92 07/30/2020    TROPONINI 0.007 07/24/2022    TROPONINI 0.030 07/30/2020      CBC - LAST 2  Lab Results   Component Value Date    WBC 7.80 07/24/2022    WBC 9.35 07/30/2020    RBC 4.14 07/24/2022    RBC 3.98 (L) 07/30/2020    HGB 12.3 07/24/2022    HGB 11.8 (L) 07/30/2020    HCT 36.7 (L) 07/24/2022    HCT 36.4 (L) 07/30/2020     07/24/2022     07/30/2020     No results found for: LABPT, INR, APTT  CHEMISTRY - LAST 2  Lab Results   Component Value Date     07/24/2022     07/30/2020    K 3.9 07/24/2022    K 4.1 07/30/2020     07/24/2022     07/30/2020    CO2 23 07/24/2022    CO2 27 07/30/2020    ANIONGAP 11 07/24/2022    ANIONGAP 8 07/30/2020    BUN 12 07/24/2022    BUN 12 07/30/2020    CREATININE 0.9 07/24/2022    CREATININE 0.8 07/30/2020     (H) 07/24/2022     (H) 07/30/2020    CALCIUM 9.2 07/24/2022    CALCIUM 9.0 07/30/2020    ALBUMIN 3.7 07/24/2022    ALBUMIN 3.6 07/30/2020    PROT 7.4 07/24/2022    PROT 7.0 07/30/2020    ALKPHOS 80 07/24/2022    ALKPHOS 78 07/30/2020    ALT 21 07/24/2022    ALT 19 07/30/2020    AST 24 07/24/2022    AST 23  07/30/2020    BILITOT 0.9 07/24/2022    BILITOT 0.6 07/30/2020      ENDOCRINE - LAST 2  Lab Results   Component Value Date    HGBA1C 5.7 07/30/2020        PHYSICAL EXAM  CONSTITUTIONAL: Well built, well nourished in no apparent distress  NECK: no carotid bruit, no JVD  LUNGS: CTA  CHEST WALL: no tenderness  HEART: regular rate and rhythm, S1, S2 normal, no murmur, click, rub or gallop   ABDOMEN: soft, non-tender; bowel sounds normal; no masses,  no organomegaly  EXTREMITIES: mild non pitting edema bilateral ankles; no calf tenderness noted  NEURO: AAO X 3    I HAVE REVIEWED :    The vital signs, most recent cardiac testing, and most recent pertinent non-cardiology provider notes.    Current Outpatient Medications   Medication Instructions    aspirin 81 mg, Oral, Daily    atorvastatin (LIPITOR) 40 mg, Oral, Nightly    carvediloL (COREG) 3.125 mg, Oral, 2 times daily with meals    coenzyme Q10 200 mg, Oral, Daily    famotidine (PEPCID) 40 MG tablet TAKE 1 TABLET BY MOUTH EVERY DAY    furosemide (LASIX) 20 mg, Oral, Daily PRN    isosorbide mononitrate (IMDUR) 30 mg, Oral, Daily    losartan (COZAAR) 25 mg, Oral, Daily    magnesium oxide (MAG-OX) 400 mg (241.3 mg magnesium) tablet TAKE 1 TABLET BY MOUTH EVERY DAY    nitroGLYCERIN 0.4 MG/HR TD PT24 (NITRODUR) 0.4 mg/hr 1 patch, Transdermal, Daily      Assessment & Plan     Localized edema  Mild non pitting dependent edema in ankles.   No calf pain. No Shortness of breath or fluid retention elsewhere  Discussed using compression hose  Remain physically active  Increase water intake  Reduce sodium intake  PRN lasix 20 mg once daily prn fluid retention -take sparingly- pt voiced understanding  Prop up legs when sitting at home    Hiatal hernia  Takes gas ex for this    GERD (gastroesophageal reflux disease)  Taking pepcid for this, under control  Avoid overeating and avoid lying down for 1 hour after eating/drinking    Body mass index (BMI) 21.0-21.9, adult  Stable, remain  physically active and heart healthy diet    Dyslipidemia  Heart healthy diet  Last LDL 49 in 7/2020  Needs FLP- ordered  Continue lipitor 40 mg QHS    Hypertension  /70 mmHg  Continue Losartan 25 mg daily  Carvedilol 3.125 mg BID, Imdur 30 mg daily

## 2023-07-11 NOTE — ASSESSMENT & PLAN NOTE
Mild non pitting dependent edema in ankles.   No calf pain. No Shortness of breath or fluid retention elsewhere  Discussed using compression hose  Remain physically active  Increase water intake  Reduce sodium intake  PRN lasix 20 mg once daily prn fluid retention -take sparingly- pt voiced understanding  Prop up legs when sitting at home

## 2023-07-11 NOTE — PATIENT INSTRUCTIONS
Wear compression hose on during day, off at night  Remain physically active as tolerated  Increase water intake daily  Reduce sodium intake daily  PRN lasix 20 mg once daily prn fluid retention -take sparingly- pt voiced understanding  Prop up legs when sitting at home

## 2023-07-12 ENCOUNTER — TELEPHONE (OUTPATIENT)
Dept: CARDIOLOGY | Facility: CLINIC | Age: 73
End: 2023-07-12
Payer: MEDICARE

## 2023-07-12 NOTE — TELEPHONE ENCOUNTER
----- Message from Marlo Evans sent at 7/11/2023 12:28 PM CDT -----  Type: Need Medical Advice   Who Called: Patient   Best callback number: [phone]  Additional Information: Please send Labs to [facility] in [facility]. Please call patient once orders have been sent   Please call to further assist, Thanks

## 2023-07-12 NOTE — TELEPHONE ENCOUNTER
----- Message from Jalyn Dinh, Patient Care Assistant sent at 7/12/2023  8:31 AM CDT -----  Regarding: advice  Contact: pt  Type: Needs Medical Advice    Who Called:  pt     Best Call Back Number: [phone] (home)     Additional Information: fax # [phone] [facility]

## 2023-08-03 RX ORDER — FUROSEMIDE 20 MG/1
TABLET ORAL
Qty: 90 TABLET | Refills: 3 | Status: SHIPPED | OUTPATIENT
Start: 2023-08-03 | End: 2023-11-28 | Stop reason: SDUPTHER

## 2023-09-04 NOTE — TELEPHONE ENCOUNTER
----- Message from Balwinder Gabriel sent at 10/5/2022  9:17 AM CDT -----  Regarding: rescheduling echo  Contact: patient  Patient want to speak with a nurse regarding rescheduling Echo, please call back at [phone] (home)        yes yes yes yes yes yes yes yes

## 2023-10-12 RX ORDER — LOSARTAN POTASSIUM 25 MG/1
25 TABLET ORAL
Qty: 90 TABLET | Refills: 3 | Status: SHIPPED | OUTPATIENT
Start: 2023-10-12 | End: 2023-11-28 | Stop reason: SDUPTHER

## 2023-10-12 RX ORDER — FAMOTIDINE 40 MG/1
TABLET, FILM COATED ORAL
Qty: 90 TABLET | Refills: 3 | Status: SHIPPED | OUTPATIENT
Start: 2023-10-12 | End: 2023-11-28 | Stop reason: SDUPTHER

## 2023-10-26 NOTE — TELEPHONE ENCOUNTER
----- Message from Alissa Gerber sent at 10/26/2023 10:39 AM CDT -----  Type: Needs Medical Advice  Who Called:  pt  Symptoms (please be specific):  pt said she need to speak to the burse--said her pharmacy tried to contact the office 3 times yesterday to have her isosorbide mononitrate (IMDUR) 30 MG 24 hr tablet refill and they still have not gotten a response from the office--said so they called her and asked her to reach out to the office to see if she can get it refilled--please call and advise    Pharmacy name and phone #:    [facility]/pharmacy #[id number] - [place], [place] - [address]  [address] [place] [address]  Phone: [phone] Fax: [phone]      Best Call Back Number: [phone] (home)     Additional Information: thank you

## 2023-10-27 RX ORDER — ISOSORBIDE MONONITRATE 30 MG/1
30 TABLET, EXTENDED RELEASE ORAL
Qty: 90 TABLET | Refills: 3 | Status: SHIPPED | OUTPATIENT
Start: 2023-10-27 | End: 2023-11-28 | Stop reason: SDUPTHER

## 2023-10-27 NOTE — TELEPHONE ENCOUNTER
----- Message from Destin Castro sent at 10/27/2023  3:29 PM CDT -----  Regarding: refill  Contact: arisa at [phone]  Type:  RX Refill Request    Who Called:  Raisa  Refill or New Rx:  refill    RX Name and Strength:    isosorbide mononitrate (IMDUR) 30 MG 24 hr tablet 90 tablet 3 10/18/2022 -   Sig - Route: Take 1 tablet (30 mg total) by mouth once daily. - Oral   Sent to pharmacy as: isosorbide mononitrate (IMDUR) 30 MG 24 hr tablet   E-Prescribing Status: Receipt confirmed by pharmacy (10/18/2022  4:39 PM CDT)     How is the patient currently taking it? (ex. 1XDay):  see above  Is this a 30 day or 90 day RX:  see above    Preferred Pharmacy with phone number:    [facility]/pharmacy #[id number] - [place], [place] - [address]  [address] [place] [address]  Phone: [phone] Fax: [phone]    Local or Mail Order:  local    Ordering Provider:  Raisa Fields Call Back Number:  [phone]    Additional Information:  pt is out of med. Pharm did not try to fill until 10/24/23 which is after expiration of last year's rx. Pt has upcoming appt with Dr Medley 11/28/23.

## 2023-11-14 RX ORDER — NITROGLYCERIN 80 MG/1
1 PATCH TRANSDERMAL DAILY
Qty: 90 PATCH | Refills: 3 | Status: SHIPPED | OUTPATIENT
Start: 2023-11-14

## 2023-11-27 ENCOUNTER — TELEPHONE (OUTPATIENT)
Dept: CARDIOLOGY | Facility: CLINIC | Age: 73
End: 2023-11-27
Payer: MEDICARE

## 2023-11-27 NOTE — TELEPHONE ENCOUNTER
----- Message from Marlo Evans sent at 11/27/2023 11:08 AM CST -----  Type: Need Medical Advice   Who Called: Patient  Best callback number: [phone]   Additional Information: Patient returned call, she will be at tomorrow's appointment   Please call to further assist, Thanks.

## 2023-11-28 ENCOUNTER — OFFICE VISIT (OUTPATIENT)
Dept: CARDIOLOGY | Facility: CLINIC | Age: 73
End: 2023-11-28
Payer: MEDICARE

## 2023-11-28 VITALS
HEIGHT: 68 IN | WEIGHT: 141 LBS | OXYGEN SATURATION: 100 % | RESPIRATION RATE: 16 BRPM | HEART RATE: 69 BPM | SYSTOLIC BLOOD PRESSURE: 110 MMHG | DIASTOLIC BLOOD PRESSURE: 62 MMHG | BODY MASS INDEX: 21.37 KG/M2

## 2023-11-28 DIAGNOSIS — I25.110 CORONARY ARTERY DISEASE INVOLVING NATIVE CORONARY ARTERY OF NATIVE HEART WITH UNSTABLE ANGINA PECTORIS: ICD-10-CM

## 2023-11-28 DIAGNOSIS — I10 PRIMARY HYPERTENSION: Primary | ICD-10-CM

## 2023-11-28 DIAGNOSIS — E78.5 DYSLIPIDEMIA: ICD-10-CM

## 2023-11-28 DIAGNOSIS — K21.00 GASTROESOPHAGEAL REFLUX DISEASE WITH ESOPHAGITIS WITHOUT HEMORRHAGE: ICD-10-CM

## 2023-11-28 PROCEDURE — 99999 PR PBB SHADOW E&M-EST. PATIENT-LVL III: CPT | Mod: PBBFAC,,, | Performed by: INTERNAL MEDICINE

## 2023-11-28 PROCEDURE — 99214 OFFICE O/P EST MOD 30 MIN: CPT | Mod: S$PBB,,, | Performed by: INTERNAL MEDICINE

## 2023-11-28 PROCEDURE — 99999 PR PBB SHADOW E&M-EST. PATIENT-LVL III: ICD-10-PCS | Mod: PBBFAC,,, | Performed by: INTERNAL MEDICINE

## 2023-11-28 PROCEDURE — 99213 OFFICE O/P EST LOW 20 MIN: CPT | Mod: PBBFAC,PN | Performed by: INTERNAL MEDICINE

## 2023-11-28 PROCEDURE — 99214 PR OFFICE/OUTPT VISIT, EST, LEVL IV, 30-39 MIN: ICD-10-PCS | Mod: S$PBB,,, | Performed by: INTERNAL MEDICINE

## 2023-11-28 RX ORDER — ATORVASTATIN CALCIUM 40 MG/1
40 TABLET, FILM COATED ORAL NIGHTLY
Qty: 90 TABLET | Refills: 3 | Status: SHIPPED | OUTPATIENT
Start: 2023-11-28

## 2023-11-28 RX ORDER — FAMOTIDINE 40 MG/1
40 TABLET, FILM COATED ORAL NIGHTLY
Qty: 90 TABLET | Refills: 3 | Status: SHIPPED | OUTPATIENT
Start: 2023-11-28

## 2023-11-28 RX ORDER — ISOSORBIDE MONONITRATE 30 MG/1
30 TABLET, EXTENDED RELEASE ORAL DAILY
Qty: 90 TABLET | Refills: 3 | Status: SHIPPED | OUTPATIENT
Start: 2023-11-28 | End: 2024-11-27

## 2023-11-28 RX ORDER — FUROSEMIDE 20 MG/1
TABLET ORAL
Qty: 90 TABLET | Refills: 3 | Status: SHIPPED | OUTPATIENT
Start: 2023-11-28

## 2023-11-28 RX ORDER — CARVEDILOL 3.12 MG/1
3.12 TABLET ORAL 2 TIMES DAILY WITH MEALS
Qty: 180 TABLET | Refills: 3 | Status: SHIPPED | OUTPATIENT
Start: 2023-11-28

## 2023-11-28 RX ORDER — LOSARTAN POTASSIUM 25 MG/1
25 TABLET ORAL DAILY
Qty: 90 TABLET | Refills: 3 | Status: SHIPPED | OUTPATIENT
Start: 2023-11-28 | End: 2024-11-27

## 2023-11-28 NOTE — PROGRESS NOTES
Subjective:    Patient ID:  Holly French is a 73 y.o. female patient here for evaluation Follow-up      History of Present Illness:   73-year-old with history of known coronary artery disease and previous coronary revascularization arterial hypertension seeking follow-up evaluation.  She is been doing very well with no new symptoms of angina shortness of breath PND orthopnea.  She remains reasonably active during this effort no cardiac symptoms are noted.    No GI  neurologic musculoskeletal symptoms noted.          Review of patient's allergies indicates:   Allergen Reactions    Protonix [pantoprazole] Diarrhea    Enalapril     Moxifloxacin     Sulfa (sulfonamide antibiotics)        Past Medical History:   Diagnosis Date    Coronary artery disease     cardiac stent    Hypertension      History reviewed. No pertinent surgical history.  Social History     Tobacco Use    Smoking status: Never    Smokeless tobacco: Never   Substance Use Topics    Alcohol use: Not Currently    Drug use: Not Currently        Review of Systems:    As noted in HPI in addition      REVIEW OF SYSTEMS  CARDIOVASCULAR: No recent chest pain, palpitations, arm, neck, or jaw pain  RESPIRATORY: No recent fever, cough chills, SOB or congestion  : No blood in the urine  GI: No Nausea, vomiting, constipation, diarrhea, blood, or reflux.  MUSCULOSKELETAL: No myalgias  NEURO: No lightheadedness or dizziness  EYES: No Double vision, blurry, vision or headache              Objective        Vitals:    11/28/23 1317   BP: 110/62   Pulse: 69   Resp: 16       LIPIDS - LAST 2   Lab Results   Component Value Date    CHOL 114 (L) 07/30/2020    HDL 46 07/30/2020    LDLCALC 49.4 (L) 07/30/2020    TRIG 93 07/30/2020    CHOLHDL 40.4 07/30/2020       CBC - LAST 2  Lab Results   Component Value Date    WBC 7.80 07/24/2022    WBC 9.35 07/30/2020    RBC 4.14 07/24/2022    RBC 3.98 (L) 07/30/2020    HGB 12.3 07/24/2022    HGB 11.8 (L) 07/30/2020    HCT 36.7 (L)  "07/24/2022    HCT 36.4 (L) 07/30/2020    MCV 89 07/24/2022    MCV 92 07/30/2020    MCH 29.7 07/24/2022    MCH 29.6 07/30/2020    MCHC 33.5 07/24/2022    MCHC 32.4 07/30/2020    RDW 12.5 07/24/2022    RDW 12.5 07/30/2020     07/24/2022     07/30/2020    MPV 10.0 07/24/2022    MPV 10.4 07/30/2020    GRAN 5.2 07/24/2022    GRAN 67.1 07/24/2022    LYMPH 1.7 07/24/2022    LYMPH 22.1 07/24/2022    MONO 0.6 07/24/2022    MONO 7.9 07/24/2022    BASO 0.04 07/24/2022    BASO 0.04 07/30/2020    NRBC 0 07/24/2022    NRBC 0 07/30/2020       CHEMISTRY & LIVER FUNCTION - LAST 2  Lab Results   Component Value Date     07/24/2022     07/30/2020    K 3.9 07/24/2022    K 4.1 07/30/2020     07/24/2022     07/30/2020    CO2 23 07/24/2022    CO2 27 07/30/2020    ANIONGAP 11 07/24/2022    ANIONGAP 8 07/30/2020    BUN 12 07/24/2022    BUN 12 07/30/2020    CREATININE 0.9 07/24/2022    CREATININE 0.8 07/30/2020     (H) 07/24/2022     (H) 07/30/2020    CALCIUM 9.2 07/24/2022    CALCIUM 9.0 07/30/2020    ALBUMIN 3.7 07/24/2022    ALBUMIN 3.6 07/30/2020    PROT 7.4 07/24/2022    PROT 7.0 07/30/2020    ALKPHOS 80 07/24/2022    ALKPHOS 78 07/30/2020    ALT 21 07/24/2022    ALT 19 07/30/2020    AST 24 07/24/2022    AST 23 07/30/2020    BILITOT 0.9 07/24/2022    BILITOT 0.6 07/30/2020        CARDIAC PROFILE - LAST 2  Lab Results   Component Value Date    BNP 38 07/24/2022    BNP 92 07/30/2020    TROPONINI 0.007 07/24/2022    TROPONINI 0.030 07/30/2020        COAGULATION - LAST 2  No results found for: "LABPT", "INR", "APTT"    ENDOCRINE & PSA - LAST 2  Lab Results   Component Value Date    HGBA1C 5.7 07/30/2020        ECHOCARDIOGRAM RESULTS  Results for orders placed during the hospital encounter of 10/13/22    Echo    Interpretation Summary  · The left ventricle is normal in size with concentric remodeling and low normal systolic function.  · Mid and distal anterior wall, apex and apical lateral " wall and apical septum appears severely hypokinetic to akinetic. The remaining walls appear to move fairly well  · The estimated ejection fraction is 50%.  · Indeterminate left ventricular diastolic function.  · Normal right ventricular size with normal right ventricular systolic function.  · There are segmental left ventricular wall motion abnormalities.  · Moderate aortic regurgitation.  · Normal central venous pressure (3 mmHg).  · The estimated PA systolic pressure is 20 mmHg.      CURRENT/PREVIOUS VISIT EKG  Results for orders placed or performed during the hospital encounter of 07/24/22   EKG 12-lead    Collection Time: 07/24/22  8:02 PM    Narrative    Test Reason : R07.9,    Vent. Rate : 070 BPM     Atrial Rate : 070 BPM     P-R Int : 188 ms          QRS Dur : 088 ms      QT Int : 378 ms       P-R-T Axes : 059 -42 059 degrees     QTc Int : 408 ms    Normal sinus rhythm  Left axis deviation  Minimal voltage criteria for LVH, may be normal variant  Possible Lateral infarct (cited on or before 30-JUL-2020)  Abnormal ECG  When compared with ECG of 30-JUL-2020 09:24,  T wave inversion less evident in Lateral leads  QT has lengthened  Confirmed by Oumar Aldana MD ([id number]) on 7/25/2022 11:25:33 AM    Referred By: [email or web address]   SELF           Confirmed By:Oumar Aldana MD     No valid procedures specified.   Results for orders placed during the hospital encounter of 08/31/22    Nuclear Stress - Cardiology Interpreted    Interpretation Summary    Abnormal myocardial perfusion scan.    There are two significant perfusion abnormalities.    Perfusion Abnormality #1 - There is a severe intensity, large sized, fixed perfusion abnormality consistent with scar in the mid to apical apical, anteroapical, inferoapical and apical septal wall(s) in the typical distribution of the LAD territory.    Perfusion Abnormality #2 - There is a small to moderate sized, moderate to severe intensity, fixed perfusion abnormality consistent with scar in  the inferolateral wall(s) in the typical distribution of the OM2 territory.    There are no other significant perfusion abnormalities.    The gated perfusion images showed an ejection fraction of 39% post stress. Normal ejection fraction is greater than 47%.    LV cavity size is  and normal at stress.    The EKG portion of this study is negative for ischemia.    The patient reported no chest pain during the stress test.    There were no arrhythmias during stress.    No valid procedures specified.    PHYSICAL EXAM  CONSTITUTIONAL: Well built, well nourished in no apparent distress  NECK: no carotid bruit, no JVD  LUNGS: CTA  CHEST WALL: no tenderness  HEART: regular rate and rhythm, S1, S2 normal, no murmur, click, rub or gallop   ABDOMEN: soft, non-tender; bowel sounds normal; no masses,  no organomegaly  EXTREMITIES: Extremities normal, no edema, no calf tenderness noted  NEURO: AAO X 3    I HAVE REVIEWED :    The vital signs, nurses notes, and all the pertinent radiology and labs.        Current Outpatient Medications   Medication Instructions    aspirin 81 mg, Oral, Daily    atorvastatin (LIPITOR) 40 mg, Oral, Nightly    carvediloL (COREG) 3.125 mg, Oral, 2 times daily with meals    coenzyme Q10 200 mg, Oral, Daily    famotidine (PEPCID) 40 MG tablet TAKE 1 TABLET BY MOUTH EVERY DAY    furosemide (LASIX) 20 MG tablet TAKE 1 TABLET BY MOUTH DAILY AS NEEDED FOR LEG SWELLING    isosorbide mononitrate (IMDUR) 30 mg, Oral    losartan (COZAAR) 25 mg, Oral    magnesium oxide (MAG-OX) 400 mg (241.3 mg magnesium) tablet TAKE 1 TABLET BY MOUTH EVERY DAY    nitroGLYCERIN 0.4 MG/HR TD PT24 (NITRODUR) 0.4 mg/hr 1 patch, Transdermal, Daily          Assessment & Plan     Hypertension  Patient is doing well her blood pressure is stable at 1 10/62 continue on Coreg 3.125 mg p.o. b.i.d. and losartan 25 mg once a day along with isosorbide mononitrate 30 mg daily.  Maintain on low-salt low-fat diet    Dyslipidemia  Stable on the  present regimen continue on Lipitor 40 mg nightly low-fat low-cholesterol diet.    Coronary artery disease  Patient has no further episodes of angina appears to be doing good continue on antianginal therapy and risk factor modification with    GERD (gastroesophageal reflux disease)  History of GERD continue on famotidine 40 mg nightly and magnesium supplements          Follow up in about 6 months (around 5/28/2024).

## 2023-11-28 NOTE — ASSESSMENT & PLAN NOTE
Patient has no further episodes of angina appears to be doing good continue on antianginal therapy and risk factor modification with

## 2023-11-28 NOTE — ASSESSMENT & PLAN NOTE
Patient is doing well her blood pressure is stable at 1 10/62 continue on Coreg 3.125 mg p.o. b.i.d. and losartan 25 mg once a day along with isosorbide mononitrate 30 mg daily.  Maintain on low-salt low-fat diet

## 2024-04-01 RX ORDER — LANOLIN ALCOHOL/MO/W.PET/CERES
CREAM (GRAM) TOPICAL
Qty: 90 TABLET | Refills: 3 | Status: SHIPPED | OUTPATIENT
Start: 2024-04-01

## 2024-05-27 ENCOUNTER — OFFICE VISIT (OUTPATIENT)
Dept: CARDIOLOGY | Facility: CLINIC | Age: 74
End: 2024-05-27
Payer: MEDICARE

## 2024-05-27 VITALS
SYSTOLIC BLOOD PRESSURE: 118 MMHG | HEART RATE: 73 BPM | OXYGEN SATURATION: 99 % | HEIGHT: 68 IN | RESPIRATION RATE: 16 BRPM | BODY MASS INDEX: 22.28 KG/M2 | DIASTOLIC BLOOD PRESSURE: 80 MMHG | WEIGHT: 147 LBS

## 2024-05-27 DIAGNOSIS — K21.9 GASTROESOPHAGEAL REFLUX DISEASE WITHOUT ESOPHAGITIS: ICD-10-CM

## 2024-05-27 DIAGNOSIS — K44.9 HIATAL HERNIA: ICD-10-CM

## 2024-05-27 DIAGNOSIS — I25.10 CORONARY ARTERY DISEASE INVOLVING NATIVE CORONARY ARTERY OF NATIVE HEART WITHOUT ANGINA PECTORIS: Primary | ICD-10-CM

## 2024-05-27 DIAGNOSIS — I10 PRIMARY HYPERTENSION: ICD-10-CM

## 2024-05-27 DIAGNOSIS — E78.5 DYSLIPIDEMIA: ICD-10-CM

## 2024-05-27 PROCEDURE — 99214 OFFICE O/P EST MOD 30 MIN: CPT | Mod: S$PBB,,, | Performed by: INTERNAL MEDICINE

## 2024-05-27 PROCEDURE — 99999 PR PBB SHADOW E&M-EST. PATIENT-LVL III: CPT | Mod: PBBFAC,,, | Performed by: INTERNAL MEDICINE

## 2024-05-27 PROCEDURE — 99213 OFFICE O/P EST LOW 20 MIN: CPT | Mod: PBBFAC,PN | Performed by: INTERNAL MEDICINE

## 2024-05-27 RX ORDER — SUCRALFATE 1 G/1
1 TABLET ORAL 2 TIMES DAILY
Qty: 180 TABLET | Refills: 3 | Status: SHIPPED | OUTPATIENT
Start: 2024-05-27 | End: 2025-05-27

## 2024-05-27 RX ORDER — LORATADINE 10 MG/1
10 TABLET ORAL DAILY
COMMUNITY

## 2024-05-27 NOTE — PROGRESS NOTES
Subjective:    Patient ID:  Holly French is a 74 y.o. female patient here for evaluation Follow-up      History of Present Illness:     Patient is a 74-year-old with history of known coronary artery disease cardiac revascularization arterial hypertension seeking follow-up evaluation.  She is doing remarkably well occasional swelling noted in her ankles and usually resolves in a day or 2.  No symptoms of any profound shortness of breath noted she did develop an inguinal hernia progressed larger area she did seek evaluation with a surgeon recommended her to continue on conservative treatment as this was painless and no incarceration was noted.    Patient has no cardiac symptoms of angina arm neck or jaw pain no significant shortness of breath with effort and no PND orthopnea noted.  No cough or congestion no fevers or chills she   She does have some postnasal drip and uses some reflux type of symptoms.        Review of patient's allergies indicates:   Allergen Reactions    Protonix [pantoprazole] Diarrhea    Enalapril     Moxifloxacin     Sulfa (sulfonamide antibiotics)        Past Medical History:   Diagnosis Date    Coronary artery disease     cardiac stent    Hypertension      History reviewed. No pertinent surgical history.  Social History     Tobacco Use    Smoking status: Never    Smokeless tobacco: Never   Substance Use Topics    Alcohol use: Not Currently    Drug use: Not Currently        Review of Systems:    As noted in HPI in addition      REVIEW OF SYSTEMS  CARDIOVASCULAR: No recent chest pain, palpitations, arm, neck, or jaw pain  RESPIRATORY: No recent fever, cough chills, SOB or congestion  : No blood in the urine  GI: No Nausea, vomiting, constipation, diarrhea, blood, she was reflux symptoms and hoarseness in her voice especially in the morning.  MUSCULOSKELETAL: No myalgias  NEURO: No lightheadedness or dizziness  EYES: No Double vision, blurry, vision or headache              Objective         Vitals:    05/27/24 1432   BP: 118/80   Pulse: 73   Resp: 16       LIPIDS - LAST 2   Lab Results   Component Value Date    CHOL 114 (L) 07/30/2020    HDL 46 07/30/2020    LDLCALC 49.4 (L) 07/30/2020    TRIG 93 07/30/2020    CHOLHDL 40.4 07/30/2020       CBC - LAST 2  Lab Results   Component Value Date    WBC 7.80 07/24/2022    WBC 9.35 07/30/2020    RBC 4.14 07/24/2022    RBC 3.98 (L) 07/30/2020    HGB 12.3 07/24/2022    HGB 11.8 (L) 07/30/2020    HCT 36.7 (L) 07/24/2022    HCT 36.4 (L) 07/30/2020    MCV 89 07/24/2022    MCV 92 07/30/2020    MCH 29.7 07/24/2022    MCH 29.6 07/30/2020    MCHC 33.5 07/24/2022    MCHC 32.4 07/30/2020    RDW 12.5 07/24/2022    RDW 12.5 07/30/2020     07/24/2022     07/30/2020    MPV 10.0 07/24/2022    MPV 10.4 07/30/2020    GRAN 5.2 07/24/2022    GRAN 67.1 07/24/2022    LYMPH 1.7 07/24/2022    LYMPH 22.1 07/24/2022    MONO 0.6 07/24/2022    MONO 7.9 07/24/2022    BASO 0.04 07/24/2022    BASO 0.04 07/30/2020    NRBC 0 07/24/2022    NRBC 0 07/30/2020       CHEMISTRY & LIVER FUNCTION - LAST 2  Lab Results   Component Value Date     07/24/2022     07/30/2020    K 3.9 07/24/2022    K 4.1 07/30/2020     07/24/2022     07/30/2020    CO2 23 07/24/2022    CO2 27 07/30/2020    ANIONGAP 11 07/24/2022    ANIONGAP 8 07/30/2020    BUN 12 07/24/2022    BUN 12 07/30/2020    CREATININE 0.9 07/24/2022    CREATININE 0.8 07/30/2020     (H) 07/24/2022     (H) 07/30/2020    CALCIUM 9.2 07/24/2022    CALCIUM 9.0 07/30/2020    ALBUMIN 3.7 07/24/2022    ALBUMIN 3.6 07/30/2020    PROT 7.4 07/24/2022    PROT 7.0 07/30/2020    ALKPHOS 80 07/24/2022    ALKPHOS 78 07/30/2020    ALT 21 07/24/2022    ALT 19 07/30/2020    AST 24 07/24/2022    AST 23 07/30/2020    BILITOT 0.9 07/24/2022    BILITOT 0.6 07/30/2020        CARDIAC PROFILE - LAST 2  Lab Results   Component Value Date    BNP 38 07/24/2022    BNP 92 07/30/2020    TROPONINI 0.007 07/24/2022    TROPONINI  "0.030 07/30/2020        COAGULATION - LAST 2  No results found for: "LABPT", "INR", "APTT"    ENDOCRINE & PSA - LAST 2  Lab Results   Component Value Date    HGBA1C 5.7 07/30/2020        ECHOCARDIOGRAM RESULTS  Results for orders placed during the hospital encounter of 10/13/22    Echo    Interpretation Summary  · The left ventricle is normal in size with concentric remodeling and low normal systolic function.  · Mid and distal anterior wall, apex and apical lateral wall and apical septum appears severely hypokinetic to akinetic. The remaining walls appear to move fairly well  · The estimated ejection fraction is 50%.  · Indeterminate left ventricular diastolic function.  · Normal right ventricular size with normal right ventricular systolic function.  · There are segmental left ventricular wall motion abnormalities.  · Moderate aortic regurgitation.  · Normal central venous pressure (3 mmHg).  · The estimated PA systolic pressure is 20 mmHg.      CURRENT/PREVIOUS VISIT EKG  Results for orders placed or performed during the hospital encounter of 07/24/22   EKG 12-lead    Collection Time: 07/24/22  8:02 PM    Narrative    Test Reason : R07.9,    Vent. Rate : 070 BPM     Atrial Rate : 070 BPM     P-R Int : 188 ms          QRS Dur : 088 ms      QT Int : 378 ms       P-R-T Axes : 059 -42 059 degrees     QTc Int : 408 ms    Normal sinus rhythm  Left axis deviation  Minimal voltage criteria for LVH, may be normal variant  Possible Lateral infarct (cited on or before 30-JUL-2020)  Abnormal ECG  When compared with ECG of 30-JUL-2020 09:24,  T wave inversion less evident in Lateral leads  QT has lengthened  Confirmed by Oumar Aldana MD ([id number]) on 7/25/2022 11:25:33 AM    Referred By: [email or web address]   SELF           Confirmed By:Oumar Aldana MD     No valid procedures specified.   Results for orders placed during the hospital encounter of 08/31/22    Nuclear Stress - Cardiology Interpreted    Interpretation Summary    Abnormal myocardial " perfusion scan.    There are two significant perfusion abnormalities.    Perfusion Abnormality #1 - There is a severe intensity, large sized, fixed perfusion abnormality consistent with scar in the mid to apical apical, anteroapical, inferoapical and apical septal wall(s) in the typical distribution of the LAD territory.    Perfusion Abnormality #2 - There is a small to moderate sized, moderate to severe intensity, fixed perfusion abnormality consistent with scar in the inferolateral wall(s) in the typical distribution of the OM2 territory.    There are no other significant perfusion abnormalities.    The gated perfusion images showed an ejection fraction of 39% post stress. Normal ejection fraction is greater than 47%.    LV cavity size is  and normal at stress.    The EKG portion of this study is negative for ischemia.    The patient reported no chest pain during the stress test.    There were no arrhythmias during stress.    No valid procedures specified.    PHYSICAL EXAM  CONSTITUTIONAL: Well built, well nourished in no apparent distress  NECK: no carotid bruit, no JVD  LUNGS: CTA  CHEST WALL: no tenderness  HEART: regular rate and rhythm, S1, S2 normal, soft systolic murmur   ABDOMEN: soft, non-tender; bowel sounds normal; no masses,  no organomegaly  EXTREMITIES: Extremities normal, trace ankle edema on the left  , no calf tenderness noted  NEURO: AAO X 3    I HAVE REVIEWED :    The vital signs, nurses notes, and all the pertinent radiology and labs.        Current Outpatient Medications   Medication Instructions    aspirin 81 mg, Oral, Daily    atorvastatin (LIPITOR) 40 mg, Oral, Nightly    carvediloL (COREG) 3.125 mg, Oral, 2 times daily with meals    coenzyme Q10 200 mg, Oral, Daily    famotidine (PEPCID) 40 mg, Oral, Nightly    furosemide (LASIX) 20 MG tablet TAKE 1 TABLET BY MOUTH DAILY AS NEEDED FOR LEG SWELLING    isosorbide mononitrate (IMDUR) 30 mg, Oral, Daily    loratadine (CLARITIN) 10 mg, Oral,  Daily    losartan (COZAAR) 25 mg, Oral, Daily    magnesium oxide (MAG-OX) 400 mg (241.3 mg magnesium) tablet TAKE 1 TABLET BY MOUTH EVERY DAY    nitroGLYCERIN 0.4 MG/HR TD PT24 (NITRODUR) 0.4 mg/hr 1 patch, Transdermal, Daily    simethicone (GAS-X ORAL) Oral    sucralfate (CARAFATE) 1 g, Oral, 2 times daily          Assessment & Plan     1. Coronary artery disease involving native coronary artery of native heart without angina pectoris  Assessment & Plan:  Coronary artery disease continue on present therapy to include Coreg at 3.125 mg p.o. b.i.d. and she is on Nitro-Dur patch 0.4 mg an hour.  And isosorbide mononitrate 30 mg a day encouraged her to continue the same.      2. Primary hypertension  Assessment & Plan:  This is well controlled on current therapy to blood pressure is 118/80 mmHg.  Continue on Coreg 3.125 mg p.o. b.i.d. losartan 25 mg a day isosorbide mononitrate 30 maintain on low-salt low-fat diet      3. Dyslipidemia  Assessment & Plan:  Continue on dyslipidemia therapy with Lipitor 40 mg nightly low-fat low-cholesterol diet      4. Gastroesophageal reflux disease without esophagitis  Assessment & Plan:  Add Carafate to her regimen see if she will able to tolerate this in addition to Pepcid and perhaps reflux symptoms are controlled.      5. Hiatal hernia  Assessment & Plan:  History of hiatal hernia and this may be contributing to her reflux symptoms as well.      Other orders  -     sucralfate (CARAFATE) 1 gram tablet; Take 1 tablet (1 g total) by mouth 2 (two) times daily.  Dispense: 180 tablet; Refill: 3          No follow-ups on file.

## 2024-05-28 ENCOUNTER — TELEPHONE (OUTPATIENT)
Dept: CARDIOLOGY | Facility: CLINIC | Age: 74
End: 2024-05-28
Payer: MEDICARE

## 2024-05-28 NOTE — TELEPHONE ENCOUNTER
----- Message from Jalyn Dinh, Patient Care Assistant sent at 5/28/2024  9:25 AM CDT -----  Regarding: appointment  Contact: pt  Type:  Sooner Appointment Request    Caller is requesting a sooner appointment.  Caller declined first available appointment listed below.  Caller will not accept being placed on the waitlist and is requesting a message be sent to doctor.    Name of Caller:  pt     When is the first available appointment?  2024    Symptoms:  6 month f/u     Would the patient rather a call back or a response via [facility]? callback    Best Call Back Number:  [phone] (home)     Additional Information:  please call to advise. Thanks!

## 2024-06-18 ENCOUNTER — TELEPHONE (OUTPATIENT)
Dept: CARDIOLOGY | Facility: CLINIC | Age: 74
End: 2024-06-18
Payer: MEDICARE

## 2024-06-18 NOTE — TELEPHONE ENCOUNTER
----- Message from Jalyn Dinh, Patient Care Assistant sent at 6/18/2024  9:50 AM CDT -----  Regarding: advice  Contact: pt  Type: Needs Medical Advice    Who Called:  pt     Best Call Back Number: [phone] (home)     Additional Information: pt states she would like a callback regarding a B12 vitamin. Please call to advise. Thanks!   prenatal chart

## 2024-06-21 ENCOUNTER — TELEPHONE (OUTPATIENT)
Dept: CARDIOLOGY | Facility: CLINIC | Age: 74
End: 2024-06-21
Payer: MEDICARE

## 2024-07-25 ENCOUNTER — TELEPHONE (OUTPATIENT)
Dept: CARDIOLOGY | Facility: CLINIC | Age: 74
End: 2024-07-25
Payer: MEDICARE

## 2024-07-25 NOTE — TELEPHONE ENCOUNTER
----- Message from Deidre Starr NP sent at 7/25/2024 11:21 AM CDT -----  Contact: pt  E-VISIT  ----- Message -----  From: Tavon Arce MA  Sent: 7/24/2024   3:55 PM CDT  To: Deidre Starr NP     was put on medication by doctor and now her toes tingle and ankles are slightly swollen  ----- Message -----  From: Deidre Starr NP  Sent: 7/24/2024   3:45 PM CDT  To: Tavon Arce MA    What med  ----- Message -----  From: Tavon Arce MA  Sent: 7/23/2024  10:58 AM CDT  To: Deidre Starr NP      ----- Message -----  From: Yvette Jasmine  Sent: 7/23/2024  10:52 AM CDT  To: [facility] Staff    Type:  Needs Medical Advice    Who Called: pt    Pharmacy name and phone #:     [id number] - [place] - [address]  [address] [place] [address]  Phone: [phone] Fax: [phone]      Would the patient rather a call back or a response via [facility]? Call back    Best Call Back Number: [phone]    Additional Information:  was put on medication by doctor and now her toes tingle and ankles are slightly swollen    6 month follow up not due till nov, schedule not open yet.    Wants to know if she should stop med or come in sooner.    Please call to advise  Thanks   No

## 2024-07-26 ENCOUNTER — OFFICE VISIT (OUTPATIENT)
Dept: CARDIOLOGY | Facility: CLINIC | Age: 74
End: 2024-07-26
Payer: MEDICARE

## 2024-07-26 DIAGNOSIS — E78.5 DYSLIPIDEMIA: Primary | ICD-10-CM

## 2024-07-26 DIAGNOSIS — I25.110 CORONARY ARTERY DISEASE INVOLVING NATIVE CORONARY ARTERY OF NATIVE HEART WITH UNSTABLE ANGINA PECTORIS: ICD-10-CM

## 2024-07-26 DIAGNOSIS — I10 PRIMARY HYPERTENSION: ICD-10-CM

## 2024-07-26 DIAGNOSIS — K21.00 GASTROESOPHAGEAL REFLUX DISEASE WITH ESOPHAGITIS WITHOUT HEMORRHAGE: ICD-10-CM

## 2024-07-26 NOTE — PROGRESS NOTES
Established Patient - Audio Only Telehealth Visit     The patient location is: Home  The chief complaint leading to consultation is: tingling in the feet  Visit type: Virtual visit with audio only (telephone)  Total time spent with patient: 12 mins       The reason for the audio only service rather than synchronous audio and video virtual visit was related to technical difficulties or patient preference/necessity.     Each patient to whom I provide medical services by telemedicine is:  (1) informed of the relationship between the physician and patient and the respective role of any other health care provider with respect to management of the patient; and (2) notified that they may decline to receive medical services by telemedicine and may withdraw from such care at any time. Patient verbally consented to receive this service via voice-only telephone call.       HPI:     Holly French is here for follow-up visit. Denies chest pain or shortness of breath. Denies recent fever cough chills or congestion. Denies blood in the urine or blood in the stool.  Denies myalgias. Denies orthopnea or peripheral edema. Denies nausea vomiting or dyspepsia. No recent arm neck or jaw pain. No lightheadedness or dizziness.   Complaints of Neuropathy like symptoms  Seeing neurology 8/18/2024       Assessment and plan:           Hyperlipidemia Medications               atorvastatin (LIPITOR) 40 MG tablet Take 1 tablet (40 mg total) by mouth every evening.           Hypertension Medications               carvediloL (COREG) 3.125 MG tablet Take 1 tablet (3.125 mg total) by mouth 2 (two) times daily with meals.    furosemide (LASIX) 20 MG tablet TAKE 1 TABLET BY MOUTH DAILY AS NEEDED FOR LEG SWELLING    isosorbide mononitrate (IMDUR) 30 MG 24 hr tablet Take 1 tablet (30 mg total) by mouth once daily.    losartan (COZAAR) 25 MG tablet Take 1 tablet (25 mg total) by mouth once daily.    nitroGLYCERIN 0.4 MG/HR TD PT24 (NITRODUR) 0.4 mg/hr  PLACE 1 PATCH ONTO THE SKIN ONCE DAILY.             Problem List Items Addressed This Visit          Cardiac/Vascular    Coronary artery disease    Hypertension    Dyslipidemia - Primary       GI    GERD (gastroesophageal reflux disease)                     This service was not originating from a related E/M service provided within the previous 7 days nor will  to an E/M service or procedure within the next 24 hours or my soonest available appointment.  Prevailing standard of care was able to be met in this audio-only visit.

## 2024-08-16 ENCOUNTER — TELEPHONE (OUTPATIENT)
Dept: CARDIOLOGY | Facility: CLINIC | Age: 74
End: 2024-08-16
Payer: MEDICARE

## 2024-08-16 NOTE — TELEPHONE ENCOUNTER
----- Message from Yvette Jasmine sent at 8/16/2024  9:55 AM CDT -----  Contact: pt  Type:  Sooner Apoointment Request    Caller is requesting a sooner appointment.  Caller declined first available appointment listed below.  Caller will not accept being placed on the waitlist and is requesting a message be sent to doctor.    Name of Caller: pt    When is the first available appointment? None    Symptoms: 6 month follow up    Would the patient rather a call back or a response via [facility]?  Call back    Best Call Back Number:[phone]    Additional Information: needing 6 month follow up in oct or nov    Please call to advise    Thanks

## 2024-08-27 ENCOUNTER — TELEPHONE (OUTPATIENT)
Dept: CARDIOLOGY | Facility: CLINIC | Age: 74
End: 2024-08-27
Payer: MEDICARE

## 2024-08-27 NOTE — TELEPHONE ENCOUNTER
----- Message from Mireille Coyle sent at 8/27/2024  2:13 PM CDT -----  Regarding: schedule apt  Contact: patient  Type:  Sooner Appointment Request    Caller is requesting a sooner appointment.  Caller declined first available appointment listed below.  Caller will not accept being placed on the waitlist and is requesting a message be sent to doctor.    Name of Caller:  patient  When is the first available appointment?    Symptoms:  check up 6 month  Would the patient rather a call back or a response via [facility]?   [facility] Call Back Number:  [phone]    Additional Information:  call to be seen zandra sinclair

## 2024-09-04 ENCOUNTER — TELEPHONE (OUTPATIENT)
Dept: CARDIOLOGY | Facility: CLINIC | Age: 74
End: 2024-09-04
Payer: MEDICARE

## 2024-09-04 NOTE — TELEPHONE ENCOUNTER
----- Message from Wanda Araujo sent at 9/4/2024  8:24 AM CDT -----  Called to confirmed the patient appointment and spoke to the patient  and he stated that he need to have the nurse call him back because he had to call the EMT for the patient and he need to speak to the nurse about the issue

## 2024-09-05 ENCOUNTER — HOSPITAL ENCOUNTER (EMERGENCY)
Facility: HOSPITAL | Age: 74
Discharge: HOME | End: 2024-09-05
Attending: EMERGENCY MEDICINE
Payer: MEDICARE

## 2024-09-05 ENCOUNTER — OFFICE VISIT (OUTPATIENT)
Dept: CARDIOLOGY | Facility: CLINIC | Age: 74
End: 2024-09-05
Payer: MEDICARE

## 2024-09-05 ENCOUNTER — TELEPHONE (OUTPATIENT)
Dept: CARDIOLOGY | Facility: CLINIC | Age: 74
End: 2024-09-05

## 2024-09-05 VITALS
SYSTOLIC BLOOD PRESSURE: 130 MMHG | BODY MASS INDEX: 21.82 KG/M2 | DIASTOLIC BLOOD PRESSURE: 78 MMHG | OXYGEN SATURATION: 100 % | WEIGHT: 144 LBS | RESPIRATION RATE: 16 BRPM | HEART RATE: 72 BPM | HEIGHT: 68 IN

## 2024-09-05 VITALS
HEART RATE: 72 BPM | DIASTOLIC BLOOD PRESSURE: 78 MMHG | TEMPERATURE: 97 F | OXYGEN SATURATION: 97 % | SYSTOLIC BLOOD PRESSURE: 149 MMHG | BODY MASS INDEX: 22.35 KG/M2 | WEIGHT: 147 LBS | RESPIRATION RATE: 16 BRPM

## 2024-09-05 DIAGNOSIS — R00.2 PALPITATIONS: Primary | ICD-10-CM

## 2024-09-05 DIAGNOSIS — K21.9 GASTROESOPHAGEAL REFLUX DISEASE WITHOUT ESOPHAGITIS: ICD-10-CM

## 2024-09-05 DIAGNOSIS — I51.9 LV DYSFUNCTION: ICD-10-CM

## 2024-09-05 DIAGNOSIS — R60.0 LOCALIZED EDEMA: ICD-10-CM

## 2024-09-05 DIAGNOSIS — I25.10 CORONARY ARTERY DISEASE INVOLVING NATIVE CORONARY ARTERY OF NATIVE HEART WITHOUT ANGINA PECTORIS: ICD-10-CM

## 2024-09-05 DIAGNOSIS — R00.2 PALPITATIONS: ICD-10-CM

## 2024-09-05 DIAGNOSIS — I10 PRIMARY HYPERTENSION: ICD-10-CM

## 2024-09-05 PROBLEM — R60.9 EDEMA: Status: ACTIVE | Noted: 2024-09-05

## 2024-09-05 LAB
ALBUMIN SERPL BCP-MCNC: 3.8 G/DL (ref 3.5–5.2)
ALP SERPL-CCNC: 67 U/L (ref 55–135)
ALT SERPL W/O P-5'-P-CCNC: 12 U/L (ref 10–44)
ANION GAP SERPL CALC-SCNC: 5 MMOL/L (ref 8–16)
AST SERPL-CCNC: 19 U/L (ref 10–40)
BASOPHILS # BLD AUTO: 0.05 K/UL (ref 0–0.2)
BASOPHILS NFR BLD: 0.6 % (ref 0–1.9)
BILIRUB SERPL-MCNC: 1.1 MG/DL (ref 0.1–1)
BNP SERPL-MCNC: 144 PG/ML (ref 0–99)
BUN SERPL-MCNC: 12 MG/DL (ref 8–23)
CALCIUM SERPL-MCNC: 9.2 MG/DL (ref 8.7–10.5)
CHLORIDE SERPL-SCNC: 104 MMOL/L (ref 95–110)
CO2 SERPL-SCNC: 28 MMOL/L (ref 23–29)
CREAT SERPL-MCNC: 0.9 MG/DL (ref 0.5–1.4)
D DIMER PPP IA.FEU-MCNC: 1.81 MG/L FEU (ref 0–0.49)
DIFFERENTIAL METHOD BLD: ABNORMAL
EOSINOPHIL # BLD AUTO: 0.1 K/UL (ref 0–0.5)
EOSINOPHIL NFR BLD: 1.3 % (ref 0–8)
ERYTHROCYTE [DISTWIDTH] IN BLOOD BY AUTOMATED COUNT: 12.8 % (ref 11.5–14.5)
EST. GFR  (NO RACE VARIABLE): >60 ML/MIN/1.73 M^2
GLUCOSE SERPL-MCNC: 126 MG/DL (ref 70–110)
HCT VFR BLD AUTO: 36.4 % (ref 37–48.5)
HGB BLD-MCNC: 12 G/DL (ref 12–16)
IMM GRANULOCYTES # BLD AUTO: 0.03 K/UL (ref 0–0.04)
IMM GRANULOCYTES NFR BLD AUTO: 0.3 % (ref 0–0.5)
INR PPP: 1.1 (ref 0.8–1.2)
LYMPHOCYTES # BLD AUTO: 1.1 K/UL (ref 1–4.8)
LYMPHOCYTES NFR BLD: 12.9 % (ref 18–48)
MAGNESIUM SERPL-MCNC: 2 MG/DL (ref 1.6–2.6)
MCH RBC QN AUTO: 30.6 PG (ref 27–31)
MCHC RBC AUTO-ENTMCNC: 33 G/DL (ref 32–36)
MCV RBC AUTO: 93 FL (ref 82–98)
MONOCYTES # BLD AUTO: 0.6 K/UL (ref 0.3–1)
MONOCYTES NFR BLD: 6.6 % (ref 4–15)
NEUTROPHILS # BLD AUTO: 6.8 K/UL (ref 1.8–7.7)
NEUTROPHILS NFR BLD: 78.3 % (ref 38–73)
NRBC BLD-RTO: 0 /100 WBC
OHS QRS DURATION: 90 MS
OHS QTC CALCULATION: 427 MS
PLATELET # BLD AUTO: 161 K/UL (ref 150–450)
PMV BLD AUTO: 10.3 FL (ref 9.2–12.9)
POTASSIUM SERPL-SCNC: 3.6 MMOL/L (ref 3.5–5.1)
PROT SERPL-MCNC: 6.9 G/DL (ref 6–8.4)
PROTHROMBIN TIME: 12 SEC (ref 9–12.5)
RBC # BLD AUTO: 3.92 M/UL (ref 4–5.4)
SODIUM SERPL-SCNC: 137 MMOL/L (ref 136–145)
TROPONIN I SERPL HS-MCNC: 9.5 PG/ML (ref 0–14.9)
WBC # BLD AUTO: 8.65 K/UL (ref 3.9–12.7)

## 2024-09-05 PROCEDURE — 36415 COLL VENOUS BLD VENIPUNCTURE: CPT | Performed by: EMERGENCY MEDICINE

## 2024-09-05 PROCEDURE — 80053 COMPREHEN METABOLIC PANEL: CPT | Performed by: EMERGENCY MEDICINE

## 2024-09-05 PROCEDURE — 99214 OFFICE O/P EST MOD 30 MIN: CPT | Mod: PBBFAC,PN | Performed by: INTERNAL MEDICINE

## 2024-09-05 PROCEDURE — 84484 ASSAY OF TROPONIN QUANT: CPT | Performed by: EMERGENCY MEDICINE

## 2024-09-05 PROCEDURE — 83880 ASSAY OF NATRIURETIC PEPTIDE: CPT | Performed by: EMERGENCY MEDICINE

## 2024-09-05 PROCEDURE — 99999 PR PBB SHADOW E&M-EST. PATIENT-LVL IV: CPT | Mod: PBBFAC,,, | Performed by: INTERNAL MEDICINE

## 2024-09-05 PROCEDURE — 85610 PROTHROMBIN TIME: CPT | Performed by: EMERGENCY MEDICINE

## 2024-09-05 PROCEDURE — 85025 COMPLETE CBC W/AUTO DIFF WBC: CPT | Performed by: EMERGENCY MEDICINE

## 2024-09-05 PROCEDURE — 99285 EMERGENCY DEPT VISIT HI MDM: CPT | Mod: 25

## 2024-09-05 PROCEDURE — 99214 OFFICE O/P EST MOD 30 MIN: CPT | Mod: S$PBB,,, | Performed by: INTERNAL MEDICINE

## 2024-09-05 PROCEDURE — 25000003 PHARM REV CODE 250: Performed by: EMERGENCY MEDICINE

## 2024-09-05 PROCEDURE — 83735 ASSAY OF MAGNESIUM: CPT | Performed by: EMERGENCY MEDICINE

## 2024-09-05 PROCEDURE — 85379 FIBRIN DEGRADATION QUANT: CPT | Performed by: EMERGENCY MEDICINE

## 2024-09-05 RX ORDER — POTASSIUM CHLORIDE 20 MEQ/1
40 TABLET, EXTENDED RELEASE ORAL ONCE
Status: COMPLETED | OUTPATIENT
Start: 2024-09-05 | End: 2024-09-05

## 2024-09-05 RX ORDER — POTASSIUM CHLORIDE 20 MEQ/1
40 TABLET, EXTENDED RELEASE ORAL ONCE
Status: DISCONTINUED | OUTPATIENT
Start: 2024-09-05 | End: 2024-09-05

## 2024-09-05 RX ADMIN — POTASSIUM CHLORIDE 40 MEQ: 1500 TABLET, EXTENDED RELEASE ORAL at 01:09

## 2024-09-05 NOTE — ASSESSMENT & PLAN NOTE
Patient has persistent swelling in both lower extremities.  Will maintain on Lasix 20 mg a day and will add Aldactone 25 mg daily to her regimen.  May have to repeat her echocardiogram to assess left ventricle function BNP was 144 today.

## 2024-09-05 NOTE — ASSESSMENT & PLAN NOTE
Patient was history of LV dysfunction combination to come present therapy obtain repeat echocardiogram

## 2024-09-05 NOTE — ED PROVIDER NOTES
"Encounter Date: 9/5/2024       History     Chief Complaint   Patient presents with    Palpitations     Home BP cuff read "abnormal heart rhythm" yesterday. Happened again this morning. Patient has appointment with Dr. Jose Daniel Medley today at 1300.     74-year-old female who has a history of coronary artery disease, hypertension, dyslipidemia, GERD, presents with complaints of having some questionable palpitations yesterday morning and again today.  Patient states he has been taking her blood pressure and the monitor on the blood pressure states that she has an abnormal rhythm.  The patient was had no prior history of any arrhythmias including AFib or SVT.  When she has the suspected abnormal rhythm she denies any associated chest pain, nausea, vomiting or diaphoresis.  No complaint of any abdominal pain.  No history any known thyroid disease or caffeine excess.  No fever.      Review of patient's allergies indicates:   Allergen Reactions    Protonix [pantoprazole] Diarrhea    Enalapril     Moxifloxacin     Sulfa (sulfonamide antibiotics)      Past Medical History:   Diagnosis Date    Coronary artery disease     cardiac stent    Hypertension      No past surgical history on file.  Family History   Problem Relation Name Age of Onset    No Known Problems Mother      No Known Problems Father       Social History     Tobacco Use    Smoking status: Never    Smokeless tobacco: Never   Substance Use Topics    Alcohol use: Not Currently    Drug use: Not Currently     Review of Systems   Constitutional:  Negative for activity change, chills, diaphoresis and fever.   HENT: Negative.  Negative for sore throat.    Respiratory:  Negative for cough, chest tightness, shortness of breath and wheezing.    Cardiovascular:  Positive for palpitations. Negative for chest pain and leg swelling.   Gastrointestinal:  Negative for abdominal pain, diarrhea, nausea and vomiting.   Genitourinary:  Negative for difficulty urinating and dysuria. "   Musculoskeletal:  Negative for back pain and neck pain.   Skin:  Negative for color change, pallor and rash.   Neurological:  Negative for dizziness, weakness and headaches.   All other systems reviewed and are negative.      Physical Exam     Initial Vitals [09/05/24 0734]   BP Pulse Resp Temp SpO2   (!) 149/78 72 16 97.4 °F (36.3 °C) 97 %      MAP       --         Physical Exam    Vitals reviewed.  Constitutional: She appears well-developed and well-nourished. She is not diaphoretic. No distress.   HENT:   Head: Normocephalic and atraumatic.   Nose: Nose normal.   Mouth/Throat: Oropharynx is clear and moist. No oropharyngeal exudate.   Eyes: Conjunctivae are normal. Pupils are equal, round, and reactive to light.   Neck: Neck supple. No JVD present.   Normal range of motion.  Cardiovascular:  Normal rate, regular rhythm, normal heart sounds and intact distal pulses.     Exam reveals no gallop and no friction rub.       No murmur heard.  Pulmonary/Chest: Breath sounds normal. No respiratory distress. She has no wheezes. She has no rhonchi. She has no rales.   Abdominal: Abdomen is soft. Bowel sounds are normal. She exhibits no distension. There is no abdominal tenderness. There is no rebound and no guarding.   Musculoskeletal:         General: No tenderness or edema. Normal range of motion.      Cervical back: Normal range of motion and neck supple.     Lymphadenopathy:     She has no cervical adenopathy.   Neurological: She is alert and oriented to person, place, and time. She has normal strength. GCS score is 15. GCS eye subscore is 4. GCS verbal subscore is 5. GCS motor subscore is 6.   Skin: Skin is warm and dry. Capillary refill takes less than 2 seconds. No rash noted. No erythema. No pallor.   Psychiatric: She has a normal mood and affect. Her behavior is normal. Judgment and thought content normal.         ED Course   Procedures  Labs Reviewed   CBC W/ AUTO DIFFERENTIAL - Abnormal       Result Value     WBC 8.65      RBC 3.92 (*)     Hemoglobin 12.0      Hematocrit 36.4 (*)     MCV 93      MCH 30.6      MCHC 33.0      RDW 12.8      Platelets 161      MPV 10.3      Immature Granulocytes 0.3      Gran # (ANC) 6.8      Immature Grans (Abs) 0.03      Lymph # 1.1      Mono # 0.6      Eos # 0.1      Baso # 0.05      nRBC 0      Gran % 78.3 (*)     Lymph % 12.9 (*)     Mono % 6.6      Eosinophil % 1.3      Basophil % 0.6      Differential Method Automated     COMPREHENSIVE METABOLIC PANEL - Abnormal    Sodium 137      Potassium 3.6      Chloride 104      CO2 28      Glucose 126 (*)     BUN 12      Creatinine 0.9      Calcium 9.2      Total Protein 6.9      Albumin 3.8      Total Bilirubin 1.1 (*)     Alkaline Phosphatase 67      AST 19      ALT 12      eGFR >60.0      Anion Gap 5 (*)    B-TYPE NATRIURETIC PEPTIDE - Abnormal     (*)    D DIMER, QUANTITATIVE - Abnormal    D-Dimer 1.81 (*)     Narrative:     D dimer  critical result(s) called and verbal readback obtained from   Ella Oseguera RN on ER by DEVONTE 09/05/2024 10:15   MAGNESIUM    Magnesium 2.0     TROPONIN I HIGH SENSITIVITY    Troponin I High Sensitivity 9.5     PROTIME-INR    Prothrombin Time 12.0      INR 1.1     D DIMER, QUANTITATIVE   TROPONIN I HIGH SENSITIVITY        ECG Results              EKG 12-lead (In process)        Collection Time Result Time QRS Duration OHS QTC Calculation    09/05/24 07:51:28 09/05/24 08:08:29 90 427                     In process by Interface, Lab In [facility] (09/05/24 08:08:36)                   Narrative:    Test Reason : R07.9,    Vent. Rate : 064 BPM     Atrial Rate : 064 BPM     P-R Int : 200 ms          QRS Dur : 090 ms      QT Int : 414 ms       P-R-T Axes : 047 -43 -75 degrees     QTc Int : 427 ms    Normal sinus rhythm  Left axis deviation  Anterior infarct (cited on or before 30-JUL-2020)  T wave abnormality, consider lateral ischemia  Abnormal ECG  When compared with ECG of 24-JUL-2022 20:02,  Questionable change  in initial forces of Lateral leads  Nonspecific T wave abnormality now evident in Inferior leads  T wave inversion more evident in Anterior-lateral leads    Referred By: [email or web address]   SELF           Confirmed By:                                   Imaging Results              X-Ray Chest AP Portable (Final result)  Result time 09/05/24 08:22:18      Final result by Anju Giron MD (09/05/24 08:22:18)                   Impression:      No acute cardiopulmonary abnormality.    Stable large hiatal hernia      Electronically signed by: Anju Giron  Date:    09/05/2024  Time:    08:22               Narrative:    EXAMINATION:  XR CHEST AP PORTABLE    CLINICAL HISTORY:  abnormal rythm;    FINDINGS:  Portable chest at 08:08 is compared to 07/24/2022 shows stable cardiomediastinal silhouette there is a large hiatal hernia.  The aorta is tortuous.    Lungs are clear. Pulmonary vasculature is normal. No acute osseous abnormality.                                       Medications - No data to display  Medical Decision Making  Amount and/or Complexity of Data Reviewed  Labs: ordered.  Radiology: ordered.              Attending Attestation:             Attending ED Notes:   ED course and MDM: Pt w/ palpitations but normal EKG who had normal screening labs but an elevated D-Dimer of 1.8 ordered by nursing but has no symptoms of chest pain or shortness of breath. Pt in need of holter monitor. Discussed with Dr Medley. Unable to order holter in ED therefore, will send to cardiology office to have order  initiated.                             Clinical Impression:  Final diagnoses:  [R00.2] Palpitations          ED Disposition Condition    Discharge Stable          ED Prescriptions    None       Follow-up Information    None          Maldonado Gomez Jr., MD  09/05/24 5096

## 2024-09-05 NOTE — PROGRESS NOTES
Subjective:    Patient ID:  Holly French is a 74 y.o. female patient here for evaluation Hospital Follow Up (She is having palpitations, like a racing )      History of Present Illness:     74-year-old lady with history of arterial hypertension coronary artery disease status post PCI woke up this morning with a sensation of fluttering in his chest.  She was seen in the emergency room today she had similar feeling yesterday and today lasts longer and workup in the emergency room was negative other than potassium of 3.6.  She was further discharged home for for evaluation clinically she was here for follow-up after this.  No occurrence of any arm neck or jaw pain noted she has persistent flutters and feeling of extreme weakness positive for swelling in the lower extremities.  Some dry cough is noted  Patient was seen by ER physician discussed the care with Dr. Maldonado Nelson in the ED.    EKG today obtained at 7:51 a.m. this morning shows sinus rhythm rate of 64 beats per minute left axis deviation old anterior infarct pattern T-wave abnormalities in the lateral leads  Review of patient's allergies indicates:   Allergen Reactions    Protonix [pantoprazole] Diarrhea    Enalapril     Moxifloxacin     Sulfa (sulfonamide antibiotics)        Past Medical History:   Diagnosis Date    Coronary artery disease     cardiac stent    Hypertension      History reviewed. No pertinent surgical history.  Social History     Tobacco Use    Smoking status: Never    Smokeless tobacco: Never   Substance Use Topics    Alcohol use: Not Currently    Drug use: Not Currently        Review of Systems:    As noted in HPI in addition      REVIEW OF SYSTEMS  CARDIOVASCULAR: No recent chest pain, palpitations, arm, neck, or jaw pain  RESPIRATORY: No recent fever, cough chills, SOB or congestion  : No blood in the urine  GI: No Nausea, vomiting, constipation, diarrhea, blood, or reflux.  MUSCULOSKELETAL: No myalgias  NEURO: No  lightheadedness or dizziness  EYES: No Double vision, blurry, vision or headache   Positive for edema in lower extremities           Objective        Vitals:    09/05/24 1359   BP: 130/78   Pulse: 72   Resp: 16       LIPIDS - LAST 2   Lab Results   Component Value Date    CHOL 114 (L) 07/30/2020    HDL 46 07/30/2020    LDLCALC 49.4 (L) 07/30/2020    TRIG 93 07/30/2020    CHOLHDL 40.4 07/30/2020       CBC - LAST 2  Lab Results   Component Value Date    WBC 8.65 09/05/2024    WBC 7.80 07/24/2022    RBC 3.92 (L) 09/05/2024    RBC 4.14 07/24/2022    HGB 12.0 09/05/2024    HGB 12.3 07/24/2022    HCT 36.4 (L) 09/05/2024    HCT 36.7 (L) 07/24/2022    MCV 93 09/05/2024    MCV 89 07/24/2022    MCH 30.6 09/05/2024    MCH 29.7 07/24/2022    MCHC 33.0 09/05/2024    MCHC 33.5 07/24/2022    RDW 12.8 09/05/2024    RDW 12.5 07/24/2022     09/05/2024     07/24/2022    MPV 10.3 09/05/2024    MPV 10.0 07/24/2022    GRAN 6.8 09/05/2024    GRAN 78.3 (H) 09/05/2024    LYMPH 1.1 09/05/2024    LYMPH 12.9 (L) 09/05/2024    MONO 0.6 09/05/2024    MONO 6.6 09/05/2024    BASO 0.05 09/05/2024    BASO 0.04 07/24/2022    NRBC 0 09/05/2024    NRBC 0 07/24/2022       CHEMISTRY & LIVER FUNCTION - LAST 2  Lab Results   Component Value Date     09/05/2024     07/24/2022    K 3.6 09/05/2024    K 3.9 07/24/2022     09/05/2024     07/24/2022    CO2 28 09/05/2024    CO2 23 07/24/2022    ANIONGAP 5 (L) 09/05/2024    ANIONGAP 11 07/24/2022    BUN 12 09/05/2024    BUN 12 07/24/2022    CREATININE 0.9 09/05/2024    CREATININE 0.9 07/24/2022     (H) 09/05/2024     (H) 07/24/2022    CALCIUM 9.2 09/05/2024    CALCIUM 9.2 07/24/2022    MG 2.0 09/05/2024    ALBUMIN 3.8 09/05/2024    ALBUMIN 3.7 07/24/2022    PROT 6.9 09/05/2024    PROT 7.4 07/24/2022    ALKPHOS 67 09/05/2024    ALKPHOS 80 07/24/2022    ALT 12 09/05/2024    ALT 21 07/24/2022    AST 19 09/05/2024    AST 24 07/24/2022    BILITOT 1.1 (H) 09/05/2024     BILITOT 0.9 07/24/2022        CARDIAC PROFILE - LAST 2  Lab Results   Component Value Date     (H) 09/05/2024    BNP 38 07/24/2022    TROPONINI 0.007 07/24/2022    TROPONINI 0.030 07/30/2020    TROPONINIHS 9.5 09/05/2024        COAGULATION - LAST 2  Lab Results   Component Value Date    INR 1.1 09/05/2024       ENDOCRINE & PSA - LAST 2  Lab Results   Component Value Date    HGBA1C 5.7 07/30/2020        ECHOCARDIOGRAM RESULTS  Results for orders placed during the hospital encounter of 10/13/22    Echo    Interpretation Summary  · The left ventricle is normal in size with concentric remodeling and low normal systolic function.  · Mid and distal anterior wall, apex and apical lateral wall and apical septum appears severely hypokinetic to akinetic. The remaining walls appear to move fairly well  · The estimated ejection fraction is 50%.  · Indeterminate left ventricular diastolic function.  · Normal right ventricular size with normal right ventricular systolic function.  · There are segmental left ventricular wall motion abnormalities.  · Moderate aortic regurgitation.  · Normal central venous pressure (3 mmHg).  · The estimated PA systolic pressure is 20 mmHg.      CURRENT/PREVIOUS VISIT EKG  Results for orders placed or performed during the hospital encounter of 09/05/24   EKG 12-lead    Collection Time: 09/05/24  7:51 AM   Result Value Ref Range    QRS Duration 90 ms    OHS QTC Calculation 427 ms    Narrative    Test Reason : R07.9,    Vent. Rate : 064 BPM     Atrial Rate : 064 BPM     P-R Int : 200 ms          QRS Dur : 090 ms      QT Int : 414 ms       P-R-T Axes : 047 -43 -75 degrees     QTc Int : 427 ms    Normal sinus rhythm  Left axis deviation  Anterior infarct (cited on or before 30-JUL-2020)  T wave abnormality, consider lateral ischemia  Abnormal ECG  When compared with ECG of 24-JUL-2022 20:02,  Questionable change in initial forces of Lateral leads  Nonspecific T wave abnormality now evident in  Inferior leads  T wave inversion more evident in Anterior-lateral leads    Referred By: [email or web address]   SELF           Confirmed By:      No valid procedures specified.   Results for orders placed during the hospital encounter of 08/31/22    Nuclear Stress - Cardiology Interpreted    Interpretation Summary    Abnormal myocardial perfusion scan.    There are two significant perfusion abnormalities.    Perfusion Abnormality #1 - There is a severe intensity, large sized, fixed perfusion abnormality consistent with scar in the mid to apical apical, anteroapical, inferoapical and apical septal wall(s) in the typical distribution of the LAD territory.    Perfusion Abnormality #2 - There is a small to moderate sized, moderate to severe intensity, fixed perfusion abnormality consistent with scar in the inferolateral wall(s) in the typical distribution of the OM2 territory.    There are no other significant perfusion abnormalities.    The gated perfusion images showed an ejection fraction of 39% post stress. Normal ejection fraction is greater than 47%.    LV cavity size is  and normal at stress.    The EKG portion of this study is negative for ischemia.    The patient reported no chest pain during the stress test.    There were no arrhythmias during stress.    No valid procedures specified.    PHYSICAL EXAM  CONSTITUTIONAL: Well built, well nourished in no apparent distress  NECK: no carotid bruit, no JVD  LUNGS:  Diminished breath sounds bilaterally   CHEST WALL: no tenderness  HEART: regular rate and rhythm, S1, S2 normal, soft systolic   ABDOMEN: soft, non-tender; bowel sounds normal; no masses,  no organomegaly  EXTREMITIES: Extremities normal, tiredness 1+ edema noted bilaterally   NEURO: AAO X 3    I HAVE REVIEWED :    The vital signs, nurses notes, and all the pertinent radiology and labs.        Current Outpatient Medications   Medication Instructions    aspirin 81 mg, Oral, Daily    atorvastatin (LIPITOR) 40 mg, Oral,  Nightly    carvediloL (COREG) 3.125 mg, Oral, 2 times daily with meals    coenzyme Q10 200 mg, Oral, Daily    famotidine (PEPCID) 40 mg, Oral, Nightly    furosemide (LASIX) 20 MG tablet TAKE 1 TABLET BY MOUTH DAILY AS NEEDED FOR LEG SWELLING    isosorbide mononitrate (IMDUR) 30 mg, Oral, Daily    loratadine (CLARITIN) 10 mg, Oral, Daily    losartan (COZAAR) 25 mg, Oral, Daily    magnesium oxide (MAG-OX) 400 mg (241.3 mg magnesium) tablet TAKE 1 TABLET BY MOUTH EVERY DAY    nitroGLYCERIN 0.4 MG/HR TD PT24 (NITRODUR) 0.4 mg/hr 1 patch, Transdermal, Daily    simethicone (GAS-X ORAL) Oral    sucralfate (CARAFATE) 1 g, Oral, 2 times daily          Assessment & Plan     1. Palpitations  Assessment & Plan:  Palpitations predominantly in the morning and recommend to increase the Coreg 6.25 mg b.i.d. and also continue on magnesium supplements obtain a 7 day event recorder to assess the density and type of arrhythmia.  In the meanwhile continue Lasix and Aldactone along with isosorbide mononitrate    Orders:  -     Cardiac Monitor - 3-15 Day Adult (Cupid Only); Future; Expected date: 09/05/2024  -     Echo; Future; Expected date: 09/05/2024    2. Localized edema  Assessment & Plan:  Patient has persistent swelling in both lower extremities.  Will maintain on Lasix 20 mg a day and will add Aldactone 25 mg daily to her regimen.  May have to repeat her echocardiogram to assess left ventricle function BNP was 144 today.      3. Coronary artery disease involving native coronary artery of native heart without angina pectoris  Assessment & Plan:  Coronary artery disease relatively stable continue on isosorbide mononitrate 30 mg daily  Maintain on aspirin therapy Coreg 3.125 mg p.o. b.i.d. and this seemed to be helping her palpitations as    Orders:  -     Cardiac Monitor - 3-15 Day Adult (Cupid Only); Future; Expected date: 09/05/2024    4. Primary hypertension  Assessment & Plan:  Blood pressure is fairly well controlled 130/78 mm  Hg continue on Coreg 3.125 mg p.o. b.i.d., continue on losartan 25 mg a day and isosorbide mononitrate 30 mg daily and Lasix 20 mg daily      5. Gastroesophageal reflux disease without esophagitis  Assessment & Plan:  Continue on simethicone as well as Carafate      6. LV dysfunction  Assessment & Plan:  Patient was history of LV dysfunction combination to come present therapy obtain repeat echocardiogram    Orders:  -     Echo; Future; Expected date: 09/05/2024          Follow up in about 4 weeks (around 10/3/2024).

## 2024-09-05 NOTE — TELEPHONE ENCOUNTER
----- Message from Katharina Ordonez sent at 9/5/2024  3:52 PM CDT -----  Contact: pt [phone]  Type: Needs Medical Advice  Who Called:  Pt     Best Call Back Number: [phone]    Additional Information: Pt calling to ask questions about her holter. No further info. Pls call back and advise

## 2024-09-05 NOTE — ASSESSMENT & PLAN NOTE
Coronary artery disease relatively stable continue on isosorbide mononitrate 30 mg daily  Maintain on aspirin therapy Coreg 3.125 mg p.o. b.i.d. and this seemed to be helping her palpitations as

## 2024-09-05 NOTE — ASSESSMENT & PLAN NOTE
Blood pressure is fairly well controlled 130/78 mm Hg continue on Coreg 3.125 mg p.o. b.i.d., continue on losartan 25 mg a day and isosorbide mononitrate 30 mg daily and Lasix 20 mg daily

## 2024-09-05 NOTE — DISCHARGE INSTRUCTIONS
Go directly to Dr Medley's to get  scheduled for a holter monitor. Return to the ED as needed. No caffine

## 2024-09-05 NOTE — ASSESSMENT & PLAN NOTE
Palpitations predominantly in the morning and recommend to increase the Coreg 6.25 mg b.i.d. and also continue on magnesium supplements obtain a 7 day event recorder to assess the density and type of arrhythmia.  In the meanwhile continue Lasix and Aldactone along with isosorbide mononitrate

## 2024-09-06 NOTE — TELEPHONE ENCOUNTER
She just wanted to if there is anything she needed to do if her symptoms return. Advised if they are severe she would need to return to the ED

## 2024-09-09 ENCOUNTER — HOSPITAL ENCOUNTER (OUTPATIENT)
Dept: CARDIOLOGY | Facility: CLINIC | Age: 74
Discharge: HOME | End: 2024-09-09
Attending: INTERNAL MEDICINE
Payer: MEDICARE

## 2024-09-09 DIAGNOSIS — I25.10 CORONARY ARTERY DISEASE INVOLVING NATIVE CORONARY ARTERY OF NATIVE HEART WITHOUT ANGINA PECTORIS: ICD-10-CM

## 2024-09-09 DIAGNOSIS — R00.2 PALPITATIONS: ICD-10-CM

## 2024-09-17 ENCOUNTER — TELEPHONE (OUTPATIENT)
Dept: CARDIOLOGY | Facility: CLINIC | Age: 74
End: 2024-09-17
Payer: MEDICARE

## 2024-09-17 NOTE — TELEPHONE ENCOUNTER
----- Message from Tavon Arce MA sent at 9/16/2024  4:57 PM CDT -----  Regarding: FW: Needs Medical Advice  Contact: patient at [phone]    ----- Message -----  From: Tessa Bingham  Sent: 9/16/2024   4:53 PM CDT  To: [facility] Staff  Subject: Needs Medical Advice                             Type: Needs Medical Advice  Who Called:  patient at [phone]    Additional Information: patient calling to discuss the holter monitor device that was to be returned to through the [facility]. Her pharmacy no longer did it and patient just put it in a regular [employer] post dropbox. Please call and advise. Thank you

## 2024-09-18 ENCOUNTER — HOSPITAL ENCOUNTER (OUTPATIENT)
Dept: CARDIOLOGY | Facility: HOSPITAL | Age: 74
Discharge: HOME | End: 2024-09-18
Attending: INTERNAL MEDICINE
Payer: MEDICARE

## 2024-09-18 VITALS — HEIGHT: 68 IN | WEIGHT: 144 LBS | BODY MASS INDEX: 21.82 KG/M2

## 2024-09-18 DIAGNOSIS — I51.9 LV DYSFUNCTION: ICD-10-CM

## 2024-09-18 DIAGNOSIS — R00.2 PALPITATIONS: ICD-10-CM

## 2024-09-18 LAB
AORTIC ROOT ANNULUS: 4 CM
AORTIC VALVE CUSP SEPERATION: 2.5 CM
APICAL FOUR CHAMBER EJECTION FRACTION: 40 %
APICAL TWO CHAMBER EJECTION FRACTION: 46 %
AV INDEX (PROSTH): 0.86
AV MEAN GRADIENT: 3 MMHG
AV PEAK GRADIENT: 7 MMHG
AV REGURGITATION PRESSURE HALF TIME: 597 MS
AV VALVE AREA BY VELOCITY RATIO: 3.06 CM²
AV VALVE AREA: 3.27 CM²
AV VELOCITY RATIO: 0.8
BSA FOR ECHO PROCEDURE: 1.77 M2
CV ECHO LV RWT: 0.41 CM
DOP CALC AO PEAK VEL: 1.28 M/S
DOP CALC AO VTI: 28.5 CM
DOP CALC LVOT AREA: 3.8 CM2
DOP CALC LVOT DIAMETER: 2.2 CM
DOP CALC LVOT PEAK VEL: 1.03 M/S
DOP CALC LVOT STROKE VOLUME: 93.09 CM3
DOP CALCLVOT PEAK VEL VTI: 24.5 CM
E WAVE DECELERATION TIME: 292 MSEC
E/A RATIO: 0.35
E/E' RATIO: 7.83 M/S
ECHO LV POSTERIOR WALL: 0.9 CM (ref 0.6–1.1)
FRACTIONAL SHORTENING: 27 % (ref 28–44)
INTERVENTRICULAR SEPTUM: 1.1 CM (ref 0.6–1.1)
IVRT: 197 MSEC
LEFT INTERNAL DIMENSION IN SYSTOLE: 3.2 CM (ref 2.1–4)
LEFT VENTRICLE DIASTOLIC VOLUME INDEX: 49.27 ML/M2
LEFT VENTRICLE DIASTOLIC VOLUME: 87.7 ML
LEFT VENTRICLE END DIASTOLIC VOLUME APICAL 2 CHAMBER: 49.5 ML
LEFT VENTRICLE END DIASTOLIC VOLUME APICAL 4 CHAMBER: 68.6 ML
LEFT VENTRICLE MASS INDEX: 83 G/M2
LEFT VENTRICLE SYSTOLIC VOLUME INDEX: 23 ML/M2
LEFT VENTRICLE SYSTOLIC VOLUME: 41 ML
LEFT VENTRICULAR INTERNAL DIMENSION IN DIASTOLE: 4.4 CM (ref 3.5–6)
LEFT VENTRICULAR MASS: 147.83 G
LV LATERAL E/E' RATIO: 7.83 M/S
LV SEPTAL E/E' RATIO: 7.83 M/S
LVED V (TEICH): 87.7 ML
LVES V (TEICH): 41 ML
LVOT MG: 2 MMHG
LVOT MV: 0.69 CM/S
MV PEAK A VEL: 1.33 M/S
MV PEAK E VEL: 0.47 M/S
MV STENOSIS PRESSURE HALF TIME: 115 MS
MV VALVE AREA P 1/2 METHOD: 1.91 CM2
OHS LV EJECTION FRACTION SIMPSONS BIPLANE MOD: 43 %
PISA AR MAX VEL: 3.4 M/S
PISA TR MAX VEL: 2.02 M/S
RA PRESSURE ESTIMATED: 3 MMHG
RV TB RVSP: 5 MMHG
TDI LATERAL: 0.06 M/S
TDI SEPTAL: 0.06 M/S
TDI: 0.06 M/S
TR MAX PG: 16 MMHG
TV REST PULMONARY ARTERY PRESSURE: 19 MMHG
Z-SCORE OF LEFT VENTRICULAR DIMENSION IN END DIASTOLE: -1.12
Z-SCORE OF LEFT VENTRICULAR DIMENSION IN END SYSTOLE: 0.4

## 2024-09-18 PROCEDURE — 93306 TTE W/DOPPLER COMPLETE: CPT | Mod: 26,,, | Performed by: INTERNAL MEDICINE

## 2024-09-18 PROCEDURE — 93306 TTE W/DOPPLER COMPLETE: CPT

## 2024-10-03 ENCOUNTER — OFFICE VISIT (OUTPATIENT)
Dept: CARDIOLOGY | Facility: CLINIC | Age: 74
End: 2024-10-03
Payer: MEDICARE

## 2024-10-03 VITALS
RESPIRATION RATE: 16 BRPM | SYSTOLIC BLOOD PRESSURE: 122 MMHG | HEIGHT: 68 IN | DIASTOLIC BLOOD PRESSURE: 64 MMHG | OXYGEN SATURATION: 97 % | WEIGHT: 142 LBS | HEART RATE: 65 BPM | BODY MASS INDEX: 21.52 KG/M2

## 2024-10-03 DIAGNOSIS — E78.5 DYSLIPIDEMIA: ICD-10-CM

## 2024-10-03 DIAGNOSIS — I10 PRIMARY HYPERTENSION: ICD-10-CM

## 2024-10-03 DIAGNOSIS — I25.10 CORONARY ARTERY DISEASE INVOLVING NATIVE CORONARY ARTERY OF NATIVE HEART WITHOUT ANGINA PECTORIS: Primary | ICD-10-CM

## 2024-10-03 DIAGNOSIS — I51.9 LV DYSFUNCTION: ICD-10-CM

## 2024-10-03 PROCEDURE — 99214 OFFICE O/P EST MOD 30 MIN: CPT | Mod: S$PBB,,, | Performed by: INTERNAL MEDICINE

## 2024-10-03 PROCEDURE — 99999 PR PBB SHADOW E&M-EST. PATIENT-LVL III: CPT | Mod: PBBFAC,,, | Performed by: INTERNAL MEDICINE

## 2024-10-03 PROCEDURE — 99213 OFFICE O/P EST LOW 20 MIN: CPT | Mod: PBBFAC,PN | Performed by: INTERNAL MEDICINE

## 2024-10-03 RX ORDER — RANOLAZINE 500 MG/1
500 TABLET, EXTENDED RELEASE ORAL 2 TIMES DAILY
Qty: 180 TABLET | Refills: 3 | Status: SHIPPED | OUTPATIENT
Start: 2024-10-03 | End: 2025-10-03

## 2024-10-03 NOTE — ASSESSMENT & PLAN NOTE
LV dysfunction continue on Coreg 3.125, losartan 25, Lasix 20 mg daily and also on magnesium supplements I will add Jardiance 10 mg daily maintain on salt restricted diet

## 2024-10-03 NOTE — ASSESSMENT & PLAN NOTE
Blood pressure is stable at 120 2/64 mm Hg continue losartan 25 mg a day, Lasix 20 mg daily, Coreg 3.125 mg p.o. b.i.d..

## 2024-10-03 NOTE — PROGRESS NOTES
Subjective:    Patient ID:  Holly French is a 74 y.o. female patient here for evaluation Follow-up (She does have some swelling in her ankles and tingling in her toes)      History of Present Illness:   Patient is a 74-year-old with history of known coronary artery disease coronary revascularization arterial hypertension has been having intermittent episodes of palpitations.  She had workup including a monitor for a 7 day events as well as echocardiogram was done in his here for follow-up evaluation.  Patient denies having any chest discomfort no arm neck or jaw pain no dizziness lightheadedness and weakness noted.    Holter evaluation has short runs of supraventricular tachycardia on 2 occasions longest up to 10 beats.  Echocardiogram shows  Normal LV size and wall thickness ejection fraction about 45% and mild diastolic dysfunction noted.  Aortic valve sclerosis is present of moderate degree this has mild tricuspid regurgitation noted        Review of patient's allergies indicates:   Allergen Reactions    Protonix [pantoprazole] Diarrhea    Enalapril     Moxifloxacin     Sulfa (sulfonamide antibiotics)        Past Medical History:   Diagnosis Date    Coronary artery disease     cardiac stent    Hypertension      History reviewed. No pertinent surgical history.  Social History     Tobacco Use    Smoking status: Never    Smokeless tobacco: Never   Substance Use Topics    Alcohol use: Not Currently    Drug use: Not Currently        Review of Systems:    As noted in HPI in addition      REVIEW OF SYSTEMS  CARDIOVASCULAR: No recent chest pain, palpitations, arm, neck, or jaw pain  RESPIRATORY: No recent fever, cough chills, SOB or congestion  : No blood in the urine nocturia present  GI: No Nausea, vomiting, constipation, diarrhea, blood, or reflux.  MUSCULOSKELETAL: No myalgias  NEURO: No lightheadedness or dizziness  EYES: No Double vision, blurry, vision or headache   Mild ankle edema           Objective         Vitals:    10/03/24 1404   BP: 122/64   Pulse: 65   Resp: 16       LIPIDS - LAST 2   Lab Results   Component Value Date    CHOL 114 (L) 07/30/2020    HDL 46 07/30/2020    LDLCALC 49.4 (L) 07/30/2020    TRIG 93 07/30/2020    CHOLHDL 40.4 07/30/2020       CBC - LAST 2  Lab Results   Component Value Date    WBC 8.65 09/05/2024    WBC 7.80 07/24/2022    RBC 3.92 (L) 09/05/2024    RBC 4.14 07/24/2022    HGB 12.0 09/05/2024    HGB 12.3 07/24/2022    HCT 36.4 (L) 09/05/2024    HCT 36.7 (L) 07/24/2022    MCV 93 09/05/2024    MCV 89 07/24/2022    MCH 30.6 09/05/2024    MCH 29.7 07/24/2022    MCHC 33.0 09/05/2024    MCHC 33.5 07/24/2022    RDW 12.8 09/05/2024    RDW 12.5 07/24/2022     09/05/2024     07/24/2022    MPV 10.3 09/05/2024    MPV 10.0 07/24/2022    GRAN 6.8 09/05/2024    GRAN 78.3 (H) 09/05/2024    LYMPH 1.1 09/05/2024    LYMPH 12.9 (L) 09/05/2024    MONO 0.6 09/05/2024    MONO 6.6 09/05/2024    BASO 0.05 09/05/2024    BASO 0.04 07/24/2022    NRBC 0 09/05/2024    NRBC 0 07/24/2022       CHEMISTRY & LIVER FUNCTION - LAST 2  Lab Results   Component Value Date     09/05/2024     07/24/2022    K 3.6 09/05/2024    K 3.9 07/24/2022     09/05/2024     07/24/2022    CO2 28 09/05/2024    CO2 23 07/24/2022    ANIONGAP 5 (L) 09/05/2024    ANIONGAP 11 07/24/2022    BUN 12 09/05/2024    BUN 12 07/24/2022    CREATININE 0.9 09/05/2024    CREATININE 0.9 07/24/2022     (H) 09/05/2024     (H) 07/24/2022    CALCIUM 9.2 09/05/2024    CALCIUM 9.2 07/24/2022    MG 2.0 09/05/2024    ALBUMIN 3.8 09/05/2024    ALBUMIN 3.7 07/24/2022    PROT 6.9 09/05/2024    PROT 7.4 07/24/2022    ALKPHOS 67 09/05/2024    ALKPHOS 80 07/24/2022    ALT 12 09/05/2024    ALT 21 07/24/2022    AST 19 09/05/2024    AST 24 07/24/2022    BILITOT 1.1 (H) 09/05/2024    BILITOT 0.9 07/24/2022        CARDIAC PROFILE - LAST 2  Lab Results   Component Value Date     (H) 09/05/2024    BNP 38 07/24/2022     TROPONINI 0.007 07/24/2022    TROPONINI 0.030 07/30/2020    TROPONINIHS 9.5 09/05/2024        COAGULATION - LAST 2  Lab Results   Component Value Date    INR 1.1 09/05/2024       ENDOCRINE & PSA - LAST 2  Lab Results   Component Value Date    HGBA1C 5.7 07/30/2020        ECHOCARDIOGRAM RESULTS  Results for orders placed during the hospital encounter of 09/18/24    Echo    Interpretation Summary    Left Ventricle: The left ventricle is normal in size. Normal wall thickness. Severe hypokinesis to akinesis noted with the distal anterior wall and apical region.  The remaining walls appear to move fairly well.  The global ejection fraction about 45% There is diastolic dysfunction.    Right Ventricle: Normal right ventricular cavity size. Wall thickness is normal. Systolic function is normal.    Aortic Valve: The aortic valve is a trileaflet valve. There is moderate aortic valve sclerosis. There is moderate aortic regurgitation.    Tricuspid Valve: There is mild regurgitation.    IVC/SVC: Normal venous pressure at 3 mmHg.      CURRENT/PREVIOUS VISIT EKG  Results for orders placed or performed during the hospital encounter of 09/05/24   EKG 12-lead    Collection Time: 09/05/24  7:51 AM   Result Value Ref Range    QRS Duration 90 ms    OHS QTC Calculation 427 ms    Narrative    Test Reason : R07.9,    Vent. Rate : 064 BPM     Atrial Rate : 064 BPM     P-R Int : 200 ms          QRS Dur : 090 ms      QT Int : 414 ms       P-R-T Axes : 047 -43 -75 degrees     QTc Int : 427 ms    Normal sinus rhythm  Left axis deviation  Anterior infarct (cited on or before 30-JUL-2020)  T wave abnormality, consider lateral ischemia  Abnormal ECG  When compared with ECG of 24-JUL-2022 20:02,  Questionable change in initial forces of Lateral leads  Nonspecific T wave abnormality now evident in Inferior leads  T wave inversion more evident in Anterior-lateral leads  Confirmed by Woody Swenson MD ([id number]) on 9/16/2024 9:13:54 PM    Referred By:  [email or web address]   SELF           Confirmed By:Woody Swenson MD     No valid procedures specified.   Results for orders placed during the hospital encounter of 08/31/22    Nuclear Stress - Cardiology Interpreted    Interpretation Summary    Abnormal myocardial perfusion scan.    There are two significant perfusion abnormalities.    Perfusion Abnormality #1 - There is a severe intensity, large sized, fixed perfusion abnormality consistent with scar in the mid to apical apical, anteroapical, inferoapical and apical septal wall(s) in the typical distribution of the LAD territory.    Perfusion Abnormality #2 - There is a small to moderate sized, moderate to severe intensity, fixed perfusion abnormality consistent with scar in the inferolateral wall(s) in the typical distribution of the OM2 territory.    There are no other significant perfusion abnormalities.    The gated perfusion images showed an ejection fraction of 39% post stress. Normal ejection fraction is greater than 47%.    LV cavity size is  and normal at stress.    The EKG portion of this study is negative for ischemia.    The patient reported no chest pain during the stress test.    There were no arrhythmias during stress.    Interpretation Summary  Show Result Comparison     Predominant underlying rhythm was Sinus Rhythm.    Patient had a min HR of 50 bpm, max HR of 111 bpm, and avg HR of 64 bpm.    2 Supraventricular Tachycardia runs occurred, the run with the fastest interval lasting 10 beats with a max rate of 111 bpm (avg 101 bpm); the run with the fastest interval was also the longest.    Isolated SVEs were rare (<1.0%, 5540), SVE Couplets were rare (<1.0%, 92), and SVE Triplets were rare (<1.0%, 8).    Isolated VEs were rare (<1.0%, 861), and no VE Couplets or VE Triplets were present.    The patient complained of 2 episodes. The symptom(s) included heart racing. The corresponding rhythm to the patient reported event was normal sinus rhythm with a normal  TX interval. at rates of 63-78 bpm.  These symptoms were associated with PACs    There were 2 auto-triggered events corresponding with normal sinus rhythm at rates of 67-81 bpm. The symptom(s) included PACs.    See full report    PHYSICAL EXAM  CONSTITUTIONAL: Well built, well nourished in no apparent distress  NECK: no carotid bruit, no JVD  LUNGS: CTA  CHEST WALL: no tenderness  HEART: regular rate and rhythm, S1, S2 normal, no murmur, click, rub or gallop   ABDOMEN: soft, non-tender; bowel sounds normal; no masses,  no organomegaly  EXTREMITIES: Extremities normal, no edema, no calf tenderness noted  NEURO: AAO X 3    I HAVE REVIEWED :    The vital signs, nurses notes, and all the pertinent radiology and labs.        Current Outpatient Medications   Medication Instructions    aspirin 81 mg, Daily    atorvastatin (LIPITOR) 40 mg, Oral, Nightly    carvediloL (COREG) 3.125 mg, Oral, 2 times daily with meals    coenzyme Q10 200 mg, Daily    empagliflozin (JARDIANCE) 10 mg, Oral, Daily    famotidine (PEPCID) 40 mg, Oral, Nightly    furosemide (LASIX) 20 MG tablet TAKE 1 TABLET BY MOUTH DAILY AS NEEDED FOR LEG SWELLING    isosorbide mononitrate (IMDUR) 30 mg, Oral, Daily    loratadine (CLARITIN) 10 mg, Daily    losartan (COZAAR) 25 mg, Oral, Daily    magnesium oxide (MAG-OX) 400 mg (241.3 mg magnesium) tablet TAKE 1 TABLET BY MOUTH EVERY DAY    nitroGLYCERIN 0.4 MG/HR TD PT24 (NITRODUR) 0.4 mg/hr 1 patch, Transdermal, Daily    ranolazine (RANEXA) 500 mg, Oral, 2 times daily    simethicone (GAS-X ORAL) Take by mouth.    sucralfate (CARAFATE) 1 g, Oral, 2 times daily          Assessment & Plan     1. Coronary artery disease involving native coronary artery of native heart without angina pectoris  Assessment & Plan:  The myocardial perfusion study did not show any evidence of reversible ischemia.  This has evidence of previous scar tissue formation noted.  I have encouraged her to continue on same therapy however due to  extensive coronary disease recommend to add Ranexa 500 mg p.o. b.i.d. to her regimen in addition to Nitro-Dur patch      2. Dyslipidemia  Assessment & Plan:  Continue on lipid modification with Crestor 40 mg nightly low-fat low-cholesterol diet      3. Primary hypertension  Assessment & Plan:  Blood pressure is stable at 120 2/64 mm Hg continue losartan 25 mg a day, Lasix 20 mg daily, Coreg 3.125 mg p.o. b.i.d..      4. LV dysfunction  Assessment & Plan:  LV dysfunction continue on Coreg 3.125, losartan 25, Lasix 20 mg daily and also on magnesium supplements I will add Jardiance 10 mg daily maintain on salt restricted diet      Other orders  -     empagliflozin (JARDIANCE) 10 mg tablet; Take 1 tablet (10 mg total) by mouth once daily.  Dispense: 30 tablet; Refill: 11  -     ranolazine (RANEXA) 500 MG Tb12; Take 1 tablet (500 mg total) by mouth 2 (two) times daily.  Dispense: 180 tablet; Refill: 3          No follow-ups on file.

## 2024-10-03 NOTE — ASSESSMENT & PLAN NOTE
The myocardial perfusion study did not show any evidence of reversible ischemia.  This has evidence of previous scar tissue formation noted.  I have encouraged her to continue on same therapy however due to extensive coronary disease recommend to add Ranexa 500 mg p.o. b.i.d. to her regimen in addition to Nitro-Dur patch

## 2024-10-04 ENCOUNTER — TELEPHONE (OUTPATIENT)
Dept: CARDIOLOGY | Facility: CLINIC | Age: 74
End: 2024-10-04
Payer: MEDICARE

## 2024-10-04 NOTE — TELEPHONE ENCOUNTER
----- Message from Papa sent at 10/4/2024  3:44 PM CDT -----  Regarding: medication  Contact: patient  Type:  Patient Returning Call    Who Called:patient  Who Left Message for Patient:  Does the patient know what this is regarding?:empagliflozin (JARDIANCE) 10 mg tablet   Would the patient rather a call back or a response via [facility]? Pharmacy is telling patient these medication are used to treat diabetes    Best Call Back Number:[phone]  Additional Information: please call to advise

## 2024-10-16 ENCOUNTER — TELEPHONE (OUTPATIENT)
Dept: CARDIOLOGY | Facility: CLINIC | Age: 74
End: 2024-10-16
Payer: MEDICARE

## 2024-10-16 NOTE — TELEPHONE ENCOUNTER
----- Message from Deidre Starr NP sent at 10/15/2024 11:21 AM CDT -----  Regarding: FW: med issues  Contact: Holly at [phone]  Yes see if it gets better    Could be the jardiance though    I would hold jardiance first than try ranexa  ----- Message -----  From: Tavon Arce MA  Sent: 10/14/2024   9:52 AM CDT  To: Deidre Starr NP  Subject: FW: med issues                                   Can she hold the Ranexa? See if symptoms improve?  ----- Message -----  From: Destin Castro  Sent: 10/14/2024   8:42 AM CDT  To: [facility] Staff  Subject: med issues                                       Type: Needs Medical Advice    Who Called:  Holly    Symptoms (please be specific):  severe nausea     How long has patient had these symptoms:  since started two new meds this past Friday. New Rx were prescribed at last visit .    Pharmacy name and phone #:    [facility]/pharmacy #[id number] - [place] - [address]  [address] [place] [address]  Phone: [phone] Fax: [phone]    Best Call Back Number: [phone]    Additional Information: Jardiance and Ranexa are new meds. Pt states Ranexa list shows nausea as side effect. Wants to know how to proceed.

## 2024-10-17 NOTE — TELEPHONE ENCOUNTER
SPOKE WITH PT, SHE WAS NAUSEATED FOR 3 DAYS. SHE HAS BEEN HOLDING MEDS. SHE WILL REINTRODUCE RANEXA FIRST TO SEE IF SHE CAN TOLERATE MEDS. SHE WILL CALL BACK WITH A FOLLOW UP ON HER SYMPTOMS.

## 2024-10-25 ENCOUNTER — TELEPHONE (OUTPATIENT)
Dept: CARDIOLOGY | Facility: CLINIC | Age: 74
End: 2024-10-25
Payer: MEDICARE

## 2024-10-25 NOTE — TELEPHONE ENCOUNTER
----- Message from Papa sent at 10/25/2024  8:13 AM CDT -----  Regarding: advice  Contact: patient  Type:  Needs Medical Advice    Who Called: patient  Symptoms (please be specific): blood in urine   How long has patient had these symptoms:  1day  Pharmacy name and phone #:    Would the patient rather a call back or a response via [facility]? ranolazine (RANEXA) 500 MG Tb12/ patient feels it may be this medication  Best Call Back Number: [phone]  Additional Information: patient asked that I send message urgent/ worried/ Wants to speak with Tavon BERGMAN

## 2024-10-25 NOTE — TELEPHONE ENCOUNTER
CALLED PT AND ASKED HER TO HOLD THE MEDICATION TO SEE IF THE BLOOD RESOLVES. SHE WILL CALL BACK ON MONDAY TO LET ME KNOW HOW SHE IS FEELING

## 2024-10-28 ENCOUNTER — TELEPHONE (OUTPATIENT)
Dept: CARDIOLOGY | Facility: CLINIC | Age: 74
End: 2024-10-28
Payer: MEDICARE

## 2024-11-04 DIAGNOSIS — R00.2 PALPITATIONS: ICD-10-CM

## 2024-11-04 DIAGNOSIS — I25.10 CORONARY ARTERY DISEASE INVOLVING NATIVE CORONARY ARTERY OF NATIVE HEART WITHOUT ANGINA PECTORIS: Primary | ICD-10-CM

## 2024-11-05 ENCOUNTER — TELEPHONE (OUTPATIENT)
Dept: CARDIOLOGY | Facility: CLINIC | Age: 74
End: 2024-11-05
Payer: MEDICARE

## 2024-11-05 DIAGNOSIS — I25.10 CORONARY ARTERY DISEASE INVOLVING NATIVE CORONARY ARTERY OF NATIVE HEART WITHOUT ANGINA PECTORIS: ICD-10-CM

## 2024-11-05 DIAGNOSIS — R00.2 PALPITATIONS: ICD-10-CM

## 2024-11-05 RX ORDER — NITROGLYCERIN 80 MG/1
1 PATCH TRANSDERMAL DAILY
Qty: 90 PATCH | Refills: 0 | Status: SHIPPED | OUTPATIENT
Start: 2024-11-05 | End: 2024-11-05 | Stop reason: SDUPTHER

## 2024-11-05 NOTE — TELEPHONE ENCOUNTER
----- Message from Carmen sent at 11/5/2024  8:20 AM CST -----  Contact: PT  Type:  Needs Medical Advice    Who Called: PT  Symptoms (please be specific): TOUCH BASES RE: TEST RESULT  Would the patient rather a call back or a response via [facility]? CALL  Best Call Back Number: [phone]  Additional Information: THANK YOU

## 2024-11-05 NOTE — TELEPHONE ENCOUNTER
She went in for a UA, + for blood, and her vitamin D was low. She was given a rx for vitamin D. She is feeling good.    She just wanted to let us know.

## 2024-11-06 RX ORDER — NITROGLYCERIN 80 MG/1
1 PATCH TRANSDERMAL DAILY
Qty: 90 PATCH | Refills: 3 | Status: SHIPPED | OUTPATIENT
Start: 2024-11-06

## 2024-11-12 ENCOUNTER — TELEPHONE (OUTPATIENT)
Dept: CARDIOLOGY | Facility: CLINIC | Age: 74
End: 2024-11-12
Payer: MEDICARE

## 2024-11-12 NOTE — TELEPHONE ENCOUNTER
She was given the weekly script of vitamin D, she wanted to know if it would be ok with her cardiac meds. I did advise her if she started with any side effects to stop immediately this is the only new med introduced

## 2024-11-12 NOTE — TELEPHONE ENCOUNTER
----- Message from Loan sent at 11/12/2024  8:32 AM CST -----  Contact: Patient  Type:  Needs Medical Advice    Who Called:   Patient    Would the patient rather a call back or a response via [facility]?   Call back  Best Call Back Number:   [phone]    Additional Information:   States she would like to speak with Nia - states she has questions about a new medication and any possible interactions with existing medications - please call - thank you

## 2024-12-26 RX ORDER — FAMOTIDINE 40 MG/1
40 TABLET, FILM COATED ORAL NIGHTLY
Qty: 90 TABLET | Refills: 3 | Status: SHIPPED | OUTPATIENT
Start: 2024-12-26

## 2024-12-26 RX ORDER — LOSARTAN POTASSIUM 25 MG/1
25 TABLET ORAL
Qty: 90 TABLET | Refills: 3 | Status: SHIPPED | OUTPATIENT
Start: 2024-12-26

## 2024-12-31 RX ORDER — ISOSORBIDE MONONITRATE 30 MG/1
30 TABLET, EXTENDED RELEASE ORAL
Qty: 90 TABLET | Refills: 3 | Status: SHIPPED | OUTPATIENT
Start: 2024-12-31

## 2025-01-06 RX ORDER — CARVEDILOL 3.12 MG/1
3.12 TABLET ORAL 2 TIMES DAILY WITH MEALS
Qty: 180 TABLET | Refills: 3 | Status: SHIPPED | OUTPATIENT
Start: 2025-01-06 | End: 2025-01-07 | Stop reason: SDUPTHER

## 2025-01-06 RX ORDER — ATORVASTATIN CALCIUM 40 MG/1
40 TABLET, FILM COATED ORAL NIGHTLY
Qty: 90 TABLET | Refills: 3 | Status: SHIPPED | OUTPATIENT
Start: 2025-01-06 | End: 2025-01-07 | Stop reason: SDUPTHER

## 2025-01-06 NOTE — TELEPHONE ENCOUNTER
----- Message from Ananya sent at 1/6/2025  8:57 AM CST -----  Contact: self  Type:  RX Refill Request    Who Called:  pt   Refill or New Rx:  refill  RX Name and Strength:  atorvastatin (LIPITOR) 40 MG tablet  How is the patient currently taking it? (ex. 1XDay):  as directed  Is this a 30 day or 90 day RX:  90  Preferred Pharmacy with phone number:    [facility]/pharmacy #[id number] - [place] - [address]  [address] [place] [address]  Phone: [phone] Fax: [phone]  Local or Mail Order:  local   Ordering Provider:    [facility]/pharmacy #[id number] [place] - [address]  [address] [place] [address]  Phone: [phone] Fax: [phone]  Best Call Back Number:  [phone]  Additional Information:  thanks she is almost out of this medicine and pharm has no refills

## 2025-01-07 RX ORDER — ATORVASTATIN CALCIUM 40 MG/1
40 TABLET, FILM COATED ORAL NIGHTLY
Qty: 90 TABLET | Refills: 3 | Status: SHIPPED | OUTPATIENT
Start: 2025-01-07

## 2025-01-07 RX ORDER — CARVEDILOL 3.12 MG/1
3.12 TABLET ORAL 2 TIMES DAILY WITH MEALS
Qty: 180 TABLET | Refills: 3 | Status: SHIPPED | OUTPATIENT
Start: 2025-01-07

## 2025-01-07 NOTE — TELEPHONE ENCOUNTER
----- Message from Massiel sent at 1/7/2025 10:52 AM CST -----  Type: Needs Medical Advice  Who Called:  pt  Pharmacy name and phone #:    Best Call Back Number: [phone]  Additional Information: pt is calling the office for Nia , the medical assistant please give the pt a call back thanks , its about 2 medication , [facility] called her . The medications was last filled ion September but should've been refilled in December also , the medications are atorvastatin (LIPITOR) 40 MG tablet and carvediloL (COREG) 3.125 MG tablet but neither one has refills on them , needs to be sent over . Please call pt back also

## 2025-01-14 ENCOUNTER — TELEPHONE (OUTPATIENT)
Dept: CARDIOLOGY | Facility: CLINIC | Age: 75
End: 2025-01-14
Payer: MEDICARE

## 2025-01-14 NOTE — TELEPHONE ENCOUNTER
----- Message from Mamie sent at 1/14/2025  3:46 PM CST -----  Regarding: advice  Type:  Needs Medical Advice    Who Called: pt     Best Call Back Number: [phone]      Additional Information: pt wants a callback from una.  please call to discuss.

## 2025-01-16 NOTE — TELEPHONE ENCOUNTER
CALLED AND SPOKE WITH PT, SHE HAS BRUSHED HER TEETH TOO HARD, SHE IS FEELING OK. NO SWELLING, NO DRAINAGE. I DID ADVISE HER TO FOLLOW UP WITH HER PCP SINCE HER DENTIST IS OUT OF TOWN.

## 2025-02-14 ENCOUNTER — TELEPHONE (OUTPATIENT)
Dept: CARDIOLOGY | Facility: CLINIC | Age: 75
End: 2025-02-14
Payer: MEDICARE

## 2025-02-14 NOTE — TELEPHONE ENCOUNTER
----- Message from Christyboom sent at 2/14/2025  1:39 PM CST -----  Regarding: appt  Type:  Sooner Apoointment Request      Name of Caller:pt    When is the first available appointment?dept booked     Symptoms:6m fu       Best Call Back Number:[phone]      Additional Information: pt needs to be schedule. Last visit was 10/03/24. please call to discuss.

## 2025-03-20 RX ORDER — LANOLIN ALCOHOL/MO/W.PET/CERES
1 CREAM (GRAM) TOPICAL
Qty: 90 TABLET | Refills: 3 | Status: SHIPPED | OUTPATIENT
Start: 2025-03-20

## 2025-04-03 ENCOUNTER — OFFICE VISIT (OUTPATIENT)
Dept: CARDIOLOGY | Facility: CLINIC | Age: 75
End: 2025-04-03
Payer: MEDICARE

## 2025-04-03 VITALS
SYSTOLIC BLOOD PRESSURE: 134 MMHG | WEIGHT: 143.38 LBS | OXYGEN SATURATION: 97 % | DIASTOLIC BLOOD PRESSURE: 65 MMHG | HEART RATE: 64 BPM | BODY MASS INDEX: 21.8 KG/M2

## 2025-04-03 DIAGNOSIS — I10 PRIMARY HYPERTENSION: ICD-10-CM

## 2025-04-03 DIAGNOSIS — I25.10 CORONARY ARTERY DISEASE INVOLVING NATIVE CORONARY ARTERY OF NATIVE HEART WITHOUT ANGINA PECTORIS: Primary | ICD-10-CM

## 2025-04-03 DIAGNOSIS — E78.5 DYSLIPIDEMIA: ICD-10-CM

## 2025-04-03 DIAGNOSIS — K21.00 GASTROESOPHAGEAL REFLUX DISEASE WITH ESOPHAGITIS WITHOUT HEMORRHAGE: ICD-10-CM

## 2025-04-03 PROCEDURE — 99214 OFFICE O/P EST MOD 30 MIN: CPT | Mod: PBBFAC,PN | Performed by: INTERNAL MEDICINE

## 2025-04-03 PROCEDURE — 99214 OFFICE O/P EST MOD 30 MIN: CPT | Mod: S$PBB,,, | Performed by: INTERNAL MEDICINE

## 2025-04-03 PROCEDURE — 99999 PR PBB SHADOW E&M-EST. PATIENT-LVL IV: CPT | Mod: PBBFAC,,, | Performed by: INTERNAL MEDICINE

## 2025-04-03 RX ORDER — SUCRALFATE 1 G/1
1 TABLET ORAL 2 TIMES DAILY
Qty: 180 TABLET | Refills: 3 | Status: SHIPPED | OUTPATIENT
Start: 2025-04-03 | End: 2026-04-03

## 2025-04-03 NOTE — ASSESSMENT & PLAN NOTE
Blood pressure is stable at 134 over 65 mm Hg continue on Coreg 3.125 b.i.d., isosorbide mononitrate but increase the dose of 60 mg daily, ranolazine 500 mg p.o. b.i.d. and maintain losartan 25 mg a day

## 2025-04-03 NOTE — ASSESSMENT & PLAN NOTE
Clinically stable encouraged her to continue on present therapy to include isosorbide mononitrate 30 mg daily and Coreg 3.125 mg p.o. b.i.d. continue on Ranexa 500 mg p.o. b.i.d..

## 2025-04-03 NOTE — PROGRESS NOTES
Subjective:    Patient ID:  Holly French is a 75 y.o. female patient here for evaluation Follow-up, Hypertension, and Coronary Artery Disease      History of Present Illness:   Is a 68-year-old with history of arterial hypertension coronary artery disease in his LV dysfunction is seeking follow-up evaluation.  She has been experience some atypical discomfort in the left upper chest region.  And she has felt like gas buildup in his taking Gas-X on a couple of occasion with immediate and spontaneous resolution of the pain.  Denies having any radiation of pain to the neck or jaw.  No significant shortness of breath noted with this she does have some allergies to pantoprazole and she is using Carafate and Pepcid for the time being.  And Gas-X on p.r.n. basis          Review of patient's allergies indicates:   Allergen Reactions    Protonix [pantoprazole] Diarrhea    Enalapril     Moxifloxacin     Sulfa (sulfonamide antibiotics)        Past Medical History:   Diagnosis Date    Coronary artery disease     cardiac stent    Hypertension      History reviewed. No pertinent surgical history.  Social History[1]     Review of Systems:    As noted in HPI in addition      REVIEW OF SYSTEMS  CARDIOVASCULAR: No recent chest pain, palpitations, arm, neck, or jaw pain  RESPIRATORY: No recent fever, cough chills, SOB or congestion  : No blood in the urine  GI: No Nausea, vomiting, constipation, diarrhea, blood, or reflux.  Intermittent episodes of gas and belching and burping.  MUSCULOSKELETAL: No myalgias  NEURO: No lightheadedness or dizziness  EYES: No Double vision, blurry, vision or headache              Objective        Vitals:    04/03/25 1430   BP: 134/65   Pulse: 64       LIPIDS - LAST 2   Lab Results   Component Value Date    CHOL 114 (L) 07/30/2020    HDL 46 07/30/2020    LDLCALC 49.4 (L) 07/30/2020    TRIG 93 07/30/2020    CHOLHDL 40.4 07/30/2020       CBC - LAST 2  Lab Results   Component Value Date    WBC 8.65  09/05/2024    WBC 7.80 07/24/2022    RBC 3.92 (L) 09/05/2024    RBC 4.14 07/24/2022    HGB 12.0 09/05/2024    HGB 12.3 07/24/2022    HCT 36.4 (L) 09/05/2024    HCT 36.7 (L) 07/24/2022    MCV 93 09/05/2024    MCV 89 07/24/2022    MCH 30.6 09/05/2024    MCH 29.7 07/24/2022    MCHC 33.0 09/05/2024    MCHC 33.5 07/24/2022    RDW 12.8 09/05/2024    RDW 12.5 07/24/2022     09/05/2024     07/24/2022    MPV 10.3 09/05/2024    MPV 10.0 07/24/2022    GRAN 6.8 09/05/2024    GRAN 78.3 (H) 09/05/2024    LYMPH 1.1 09/05/2024    LYMPH 12.9 (L) 09/05/2024    MONO 0.6 09/05/2024    MONO 6.6 09/05/2024    BASO 0.05 09/05/2024    BASO 0.04 07/24/2022    NRBC 0 09/05/2024    NRBC 0 07/24/2022       CHEMISTRY & LIVER FUNCTION - LAST 2  Lab Results   Component Value Date     09/05/2024     07/24/2022    K 3.6 09/05/2024    K 3.9 07/24/2022     09/05/2024     07/24/2022    CO2 28 09/05/2024    CO2 23 07/24/2022    ANIONGAP 5 (L) 09/05/2024    ANIONGAP 11 07/24/2022    BUN 12 09/05/2024    BUN 12 07/24/2022    CREATININE 0.9 09/05/2024    CREATININE 0.9 07/24/2022     (H) 09/05/2024     (H) 07/24/2022    CALCIUM 9.2 09/05/2024    CALCIUM 9.2 07/24/2022    MG 2.0 09/05/2024    ALBUMIN 3.8 09/05/2024    ALBUMIN 3.7 07/24/2022    PROT 6.9 09/05/2024    PROT 7.4 07/24/2022    ALKPHOS 67 09/05/2024    ALKPHOS 80 07/24/2022    ALT 12 09/05/2024    ALT 21 07/24/2022    AST 19 09/05/2024    AST 24 07/24/2022    BILITOT 1.1 (H) 09/05/2024    BILITOT 0.9 07/24/2022        CARDIAC PROFILE - LAST 2  Lab Results   Component Value Date     (H) 09/05/2024    BNP 38 07/24/2022    TROPONINI 0.007 07/24/2022    TROPONINI 0.030 07/30/2020    TROPONINIHS 9.5 09/05/2024        COAGULATION - LAST 2  Lab Results   Component Value Date    INR 1.1 09/05/2024       ENDOCRINE & PSA - LAST 2  Lab Results   Component Value Date    HGBA1C 5.7 07/30/2020        ECHOCARDIOGRAM RESULTS  Results for orders placed  during the hospital encounter of 09/18/24    Echo    Interpretation Summary    Left Ventricle: The left ventricle is normal in size. Normal wall thickness. Severe hypokinesis to akinesis noted with the distal anterior wall and apical region.  The remaining walls appear to move fairly well.  The global ejection fraction about 45% There is diastolic dysfunction.    Right Ventricle: Normal right ventricular cavity size. Wall thickness is normal. Systolic function is normal.    Aortic Valve: The aortic valve is a trileaflet valve. There is moderate aortic valve sclerosis. There is moderate aortic regurgitation.    Tricuspid Valve: There is mild regurgitation.    IVC/SVC: Normal venous pressure at 3 mmHg.      CURRENT/PREVIOUS VISIT EKG  Results for orders placed or performed during the hospital encounter of 09/05/24   EKG 12-lead    Collection Time: 09/05/24  7:51 AM   Result Value Ref Range    QRS Duration 90 ms    [facility] QTC Calculation 427 ms    Narrative    Test Reason : R07.9,    Vent. Rate : 064 BPM     Atrial Rate : 064 BPM     P-R Int : 200 ms          QRS Dur : 090 ms      QT Int : 414 ms       P-R-T Axes : 047 -43 -75 degrees     QTc Int : 427 ms    Normal sinus rhythm  Left axis deviation  Anterior infarct (cited on or before 30-JUL-2020)  T wave abnormality, consider lateral ischemia  Abnormal ECG  When compared with ECG of 24-JUL-2022 20:02,  Questionable change in initial forces of Lateral leads  Nonspecific T wave abnormality now evident in Inferior leads  T wave inversion more evident in Anterior-lateral leads  Confirmed by Leela VILLELA, Woody LAWRENCE ([id number]) on 9/16/2024 9:13:54 PM    Referred By: [email or web address]   SELF           Confirmed By:Woody Swenson MD     No valid procedures specified.   Results for orders placed during the hospital encounter of 08/31/22    Nuclear Stress - Cardiology Interpreted    Interpretation Summary    Abnormal myocardial perfusion scan.    There are two significant perfusion  abnormalities.    Perfusion Abnormality #1 - There is a severe intensity, large sized, fixed perfusion abnormality consistent with scar in the mid to apical apical, anteroapical, inferoapical and apical septal wall(s) in the typical distribution of the LAD territory.    Perfusion Abnormality #2 - There is a small to moderate sized, moderate to severe intensity, fixed perfusion abnormality consistent with scar in the inferolateral wall(s) in the typical distribution of the OM2 territory.    There are no other significant perfusion abnormalities.    The gated perfusion images showed an ejection fraction of 39% post stress. Normal ejection fraction is greater than 47%.    LV cavity size is  and normal at stress.    The EKG portion of this study is negative for ischemia.    The patient reported no chest pain during the stress test.    There were no arrhythmias during stress.    No valid procedures specified.    PHYSICAL EXAM  CONSTITUTIONAL: Well built, well nourished in no apparent distress soft systolic murmur noted  HEART: regular rate and rhythm, S1, S2 normal,   ABDOMEN: soft, non-tender; bowel sounds normal; no masses,  no organomegaly  EXTREMITIES: Extremities normal, no edema, no calf tenderness noted  NEURO: AAO X 3    I HAVE REVIEWED :    The vital signs, nurses notes, and all the pertinent radiology and labs.  04/03/2025 EKG shows sinus rhythm left axis deviation anterior infarct pattern with T-wave abnormalities in the lateral leads noted.  Unchanged from previous      Current Outpatient Medications   Medication Instructions    aspirin 81 mg, Daily    atorvastatin (LIPITOR) 40 mg, Oral, Nightly    carvediloL (COREG) 3.125 mg, Oral, 2 times daily with meals    coenzyme Q10 200 mg, Daily    empagliflozin (JARDIANCE) 10 mg, Oral, Daily    famotidine (PEPCID) 40 mg, Oral, Nightly    furosemide (LASIX) 20 MG tablet TAKE 1 TABLET BY MOUTH DAILY AS NEEDED FOR LEG SWELLING    isosorbide mononitrate (IMDUR) 30 mg,  Oral    loratadine (CLARITIN) 10 mg, Daily    losartan (COZAAR) 25 mg, Oral    magnesium oxide (MAG-OX) 400 mg, Oral    nitroGLYCERIN 0.4 MG/HR TD PT24 (NITRODUR) 0.4 mg/hr 1 patch, Transdermal, Daily    ranolazine (RANEXA) 500 mg, Oral, 2 times daily    simethicone (GAS-X ORAL) Take by mouth.    sucralfate (CARAFATE) 1 g, Oral, 2 times daily          Assessment & Plan     1. Coronary artery disease involving native coronary artery of native heart without angina pectoris  Assessment & Plan:  Clinically stable encouraged her to continue on present therapy to include isosorbide mononitrate 30 mg daily and Coreg 3.125 mg p.o. b.i.d. continue on Ranexa 500 mg p.o. b.i.d..      2. Dyslipidemia  Assessment & Plan:  History of dyslipidemia and continue on Lipitor 40 mg a day low-fat low-cholesterol diet      3. Primary hypertension  Assessment & Plan:  Blood pressure is stable at 134 over 65 mm Hg continue on Coreg 3.125 b.i.d., isosorbide mononitrate but increase the dose of 60 mg daily, ranolazine 500 mg p.o. b.i.d. and maintain losartan 25 mg a day      4. Gastroesophageal reflux disease with esophagitis without hemorrhage  Assessment & Plan:  Symptoms are relieved with Gas-X.  She has maintain on Carafate and also Pepcid.  She has allergies to intolerance to Protonix will avoid this as well as PPI agents for the time being            No follow-ups on file.            [1]   Social History  Tobacco Use    Smoking status: Never    Smokeless tobacco: Never   Substance Use Topics    Alcohol use: Not Currently    Drug use: Not Currently

## 2025-04-03 NOTE — ASSESSMENT & PLAN NOTE
Symptoms are relieved with Gas-X.  She has maintain on Carafate and also Pepcid.  She has allergies to intolerance to Protonix will avoid this as well as PPI agents for the time being

## 2025-05-18 ENCOUNTER — NURSE TRIAGE (OUTPATIENT)
Dept: ADMINISTRATIVE | Facility: CLINIC | Age: 75
End: 2025-05-18
Payer: MEDICARE

## 2025-05-18 NOTE — TELEPHONE ENCOUNTER
"[place]  Pt called in stating that she  0725- upon awakening "did not feel right" uneven beating in chest   /81 76   148/81 78  later  0750 146/73 HR 68  1122 symbol came up show  117/62 HR 63 with symbol that indicates irregular rhythm.   Denies CP. First noticed the "off sensation few days ago" but adds that it is intermittent. Today has been  more constant. Denies SOB. Speaking  in complete sentences without difficulty.   States legs feel weak and adds she has intermittent lightheadedness but able to ambulate without difficulty. Pt advised per protocol to go to ED. Pt states she feels she can safely travel per POV and will have her  bring. Asking if she should travel to [place]. Advised to go to nearest ED and if necessary, they could transfer her. Agreeable with dispo. Advised to monitor and to call back with concerns or worsening symptoms. Verbalized understanding.       Reason for Disposition   Feeling weak or lightheaded (e.g., woozy, feeling like they might faint)    Additional Information   Negative: Passed out (e.g., fainted, lost consciousness, blacked out and was not responding)   Negative: Shock suspected (e.g., cold/pale/clammy skin, too weak to stand, low BP, rapid pulse)   Negative: Difficult to awaken or acting confused (e.g., disoriented, slurred speech)   Negative: Visible sweat on face or sweat dripping down face   Negative: Unable to walk, or can only walk with assistance (e.g., requires support)   Negative: [1] Received SHOCK from implantable cardiac defibrillator AND [2] persisting symptoms (i.e., palpitations, lightheadedness)   Negative: [1] Dizziness, lightheadedness, or weakness AND [2] heart beating very rapidly (e.g., > 140 / minute)   Negative: [1] Feeling weak or lightheaded (e.g., woozy, feeling like they might faint) AND [2] heart beating very slowly (e.g., < 50 / minute)   Negative: Sounds like a life-threatening emergency to the triager   Negative: Difficulty " breathing    Protocols used: Heart Rate and Heartbeat [id number]

## 2025-05-19 ENCOUNTER — TELEPHONE (OUTPATIENT)
Dept: CARDIOLOGY | Facility: CLINIC | Age: 75
End: 2025-05-19
Payer: MEDICARE

## 2025-05-19 NOTE — TELEPHONE ENCOUNTER
Started may 15th irregular heart beat   Irregular heart beat may 18th 7:25am beat to hard fluttering 147/81 pulse 76  No new medications patient just feels a little week no other symptoms  Blood pressure been normal today but is feeling irregular heart

## 2025-05-19 NOTE — TELEPHONE ENCOUNTER
----- Message from Tabatha sent at 5/19/2025 10:09 AM CDT -----  Regarding: advice  Contact: patient  Type:  Needs Medical AdviceWho Called: patientSymptoms (please be specific): irregular heart beat How long has patient had these symptoms:  Pharmacy name and phone #:  Would the patient rather a call back or a response via [facility]? [facility] Call Back Number: [phone] (home) Additional Information: Patient states when taking her blood pressure she gets a symbol of irregular heart beat.  She feels wobbly BP at 7:50 146/75 pulse 70 at 9:50 /64 pulse 66.  Please call patient to advise.  Thanks!

## 2025-05-20 ENCOUNTER — CLINICAL SUPPORT (OUTPATIENT)
Dept: CARDIOLOGY | Facility: CLINIC | Age: 75
End: 2025-05-20
Payer: MEDICARE

## 2025-05-20 VITALS — DIASTOLIC BLOOD PRESSURE: 71 MMHG | HEART RATE: 66 BPM | OXYGEN SATURATION: 98 % | SYSTOLIC BLOOD PRESSURE: 116 MMHG

## 2025-05-20 DIAGNOSIS — R00.2 PALPITATIONS: Primary | ICD-10-CM

## 2025-05-20 PROCEDURE — 99211 OFF/OP EST MAY X REQ PHY/QHP: CPT | Mod: S$PBB,,, | Performed by: INTERNAL MEDICINE

## 2025-05-20 PROCEDURE — 99211 OFF/OP EST MAY X REQ PHY/QHP: CPT | Mod: PBBFAC,PN

## 2025-05-20 PROCEDURE — 99999 PR PBB SHADOW E&M-EST. PATIENT-LVL I: CPT | Mod: PBBFAC,,,

## 2025-05-20 NOTE — PROGRESS NOTES
Pt came in for EKG just to be safe felt some fluttering she thought patient admitted does not drink enough fluids she is dehydrated did turger  test. EKG came out good .

## 2025-05-20 NOTE — TELEPHONE ENCOUNTER
Spoke with decided to go ER because she is just not feeling right and last time she ignored it was heart attack . Coming ot [facility]

## 2025-05-20 NOTE — TELEPHONE ENCOUNTER
----- Message from Mamie sent at 5/20/2025 10:03 AM CDT -----  Regarding: advice  Type:  Needs Medical AdviceWho Called: Paula Call Back Number: [phone] Information: pt just wanted to let mary know that she is going to go to the er.  please call to discuss.

## 2025-05-23 ENCOUNTER — TELEPHONE (OUTPATIENT)
Dept: CARDIOLOGY | Facility: CLINIC | Age: 75
End: 2025-05-23
Payer: MEDICARE

## 2025-05-23 LAB
OHS QRS DURATION: 94 MS
OHS QTC CALCULATION: 412 MS

## 2025-05-24 NOTE — TELEPHONE ENCOUNTER
----- Message from Mamie sent at 5/23/2025  4:23 PM CDT -----  Regarding: rt call  Type:  Patient Returning CallWho Called:ptWho Left Message for Patient:Ilir Swenson the patient know what this is regarding?:yesBest Call Back Number:[phone] Information:

## 2025-06-05 ENCOUNTER — TELEPHONE (OUTPATIENT)
Dept: CARDIOLOGY | Facility: CLINIC | Age: 75
End: 2025-06-05
Payer: MEDICARE

## 2025-08-24 ENCOUNTER — NURSE TRIAGE (OUTPATIENT)
Dept: ADMINISTRATIVE | Facility: CLINIC | Age: 75
End: 2025-08-24
Payer: MEDICARE

## 2025-08-24 ENCOUNTER — HOSPITAL ENCOUNTER (EMERGENCY)
Facility: HOSPITAL | Age: 75
Discharge: HOME | End: 2025-08-24
Attending: EMERGENCY MEDICINE
Payer: MEDICARE

## 2025-08-24 VITALS
TEMPERATURE: 98 F | HEART RATE: 65 BPM | BODY MASS INDEX: 21.03 KG/M2 | HEIGHT: 67 IN | WEIGHT: 134 LBS | OXYGEN SATURATION: 95 % | DIASTOLIC BLOOD PRESSURE: 67 MMHG | SYSTOLIC BLOOD PRESSURE: 145 MMHG | RESPIRATION RATE: 19 BRPM

## 2025-08-24 DIAGNOSIS — R00.2 PALPITATIONS: Primary | ICD-10-CM

## 2025-08-24 DIAGNOSIS — E83.39 HYPOPHOSPHATEMIA: ICD-10-CM

## 2025-08-24 LAB
ABSOLUTE EOSINOPHIL (OHS): 0.1 K/UL
ABSOLUTE MONOCYTE (OHS): 0.3 K/UL (ref 0.3–1)
ABSOLUTE NEUTROPHIL COUNT (OHS): 4.08 K/UL (ref 1.8–7.7)
ALBUMIN SERPL BCP-MCNC: 3.6 G/DL (ref 3.5–5.2)
ALP SERPL-CCNC: 58 UNIT/L (ref 40–150)
ALT SERPL W/O P-5'-P-CCNC: 12 UNIT/L (ref 10–44)
ANION GAP (OHS): 7 MMOL/L (ref 8–16)
AST SERPL-CCNC: 21 UNIT/L (ref 11–45)
BASOPHILS # BLD AUTO: 0.03 K/UL
BASOPHILS NFR BLD AUTO: 0.5 %
BILIRUB SERPL-MCNC: 0.9 MG/DL (ref 0.1–1)
BUN SERPL-MCNC: 16 MG/DL (ref 8–23)
CALCIUM SERPL-MCNC: 9.1 MG/DL (ref 8.7–10.5)
CHLORIDE SERPL-SCNC: 110 MMOL/L (ref 95–110)
CO2 SERPL-SCNC: 22 MMOL/L (ref 23–29)
CREAT SERPL-MCNC: 0.8 MG/DL (ref 0.5–1.4)
ERYTHROCYTE [DISTWIDTH] IN BLOOD BY AUTOMATED COUNT: 12.5 % (ref 11.5–14.5)
GFR SERPLBLD CREATININE-BSD FMLA CKD-EPI: >60 ML/MIN/1.73/M2
GLUCOSE SERPL-MCNC: 135 MG/DL (ref 70–110)
HCT VFR BLD AUTO: 33.8 % (ref 37–48.5)
HGB BLD-MCNC: 11.1 GM/DL (ref 12–16)
IMM GRANULOCYTES # BLD AUTO: 0.01 K/UL (ref 0–0.04)
IMM GRANULOCYTES NFR BLD AUTO: 0.2 % (ref 0–0.5)
LYMPHOCYTES # BLD AUTO: 1.16 K/UL (ref 1–4.8)
MAGNESIUM SERPL-MCNC: 2.1 MG/DL (ref 1.6–2.6)
MCH RBC QN AUTO: 29.1 PG (ref 27–31)
MCHC RBC AUTO-ENTMCNC: 32.8 G/DL (ref 32–36)
MCV RBC AUTO: 89 FL (ref 82–98)
NUCLEATED RBC (/100WBC) (OHS): 0 /100 WBC
PHOSPHATE SERPL-MCNC: 2.6 MG/DL (ref 2.7–4.5)
PLATELET # BLD AUTO: 149 K/UL (ref 150–450)
PMV BLD AUTO: 10.4 FL (ref 9.2–12.9)
POTASSIUM SERPL-SCNC: 3.9 MMOL/L (ref 3.5–5.1)
PROT SERPL-MCNC: 6.5 GM/DL (ref 6–8.4)
RBC # BLD AUTO: 3.82 M/UL (ref 4–5.4)
RELATIVE EOSINOPHIL (OHS): 1.8 %
RELATIVE LYMPHOCYTE (OHS): 20.4 % (ref 18–48)
RELATIVE MONOCYTE (OHS): 5.3 % (ref 4–15)
RELATIVE NEUTROPHIL (OHS): 71.8 % (ref 38–73)
SODIUM SERPL-SCNC: 139 MMOL/L (ref 136–145)
TROPONIN I SERPL HS-MCNC: 7 NG/L
TSH SERPL-ACNC: 1.45 UIU/ML (ref 0.4–4)
WBC # BLD AUTO: 5.68 K/UL (ref 3.9–12.7)

## 2025-08-24 PROCEDURE — 83735 ASSAY OF MAGNESIUM: CPT | Performed by: EMERGENCY MEDICINE

## 2025-08-24 PROCEDURE — 94760 N-INVAS EAR/PLS OXIMETRY 1: CPT

## 2025-08-24 PROCEDURE — 84100 ASSAY OF PHOSPHORUS: CPT | Performed by: EMERGENCY MEDICINE

## 2025-08-24 PROCEDURE — 93010 ELECTROCARDIOGRAM REPORT: CPT | Mod: ,,, | Performed by: INTERNAL MEDICINE

## 2025-08-24 PROCEDURE — 84484 ASSAY OF TROPONIN QUANT: CPT | Performed by: EMERGENCY MEDICINE

## 2025-08-24 PROCEDURE — 25000003 PHARM REV CODE 250: Performed by: EMERGENCY MEDICINE

## 2025-08-24 PROCEDURE — 80053 COMPREHEN METABOLIC PANEL: CPT | Performed by: EMERGENCY MEDICINE

## 2025-08-24 PROCEDURE — 85025 COMPLETE CBC W/AUTO DIFF WBC: CPT | Performed by: EMERGENCY MEDICINE

## 2025-08-24 PROCEDURE — 93005 ELECTROCARDIOGRAM TRACING: CPT | Performed by: INTERNAL MEDICINE

## 2025-08-24 PROCEDURE — 99284 EMERGENCY DEPT VISIT MOD MDM: CPT | Mod: 25

## 2025-08-24 PROCEDURE — 84443 ASSAY THYROID STIM HORMONE: CPT | Performed by: EMERGENCY MEDICINE

## 2025-08-24 RX ORDER — SODIUM,POTASSIUM PHOSPHATES 280-250MG
1 POWDER IN PACKET (EA) ORAL
Status: COMPLETED | OUTPATIENT
Start: 2025-08-24 | End: 2025-08-24

## 2025-08-24 RX ADMIN — POTASSIUM & SODIUM PHOSPHATES POWDER PACK 280-160-250 MG 1 PACKET: 280-160-250 PACK at 05:08

## 2025-08-25 ENCOUNTER — TELEPHONE (OUTPATIENT)
Dept: CARDIOLOGY | Facility: CLINIC | Age: 75
End: 2025-08-25
Payer: MEDICARE

## 2025-08-25 LAB
OHS QRS DURATION: 98 MS
OHS QTC CALCULATION: 408 MS

## 2025-08-29 ENCOUNTER — TELEPHONE (OUTPATIENT)
Dept: CARDIOLOGY | Facility: CLINIC | Age: 75
End: 2025-08-29
Payer: MEDICARE

## 2025-09-04 RX ORDER — LOSARTAN POTASSIUM 25 MG/1
25 TABLET ORAL DAILY
Qty: 90 TABLET | Refills: 3 | Status: SHIPPED | OUTPATIENT
Start: 2025-09-04